# Patient Record
Sex: FEMALE | Race: WHITE | NOT HISPANIC OR LATINO | Employment: OTHER | URBAN - METROPOLITAN AREA
[De-identification: names, ages, dates, MRNs, and addresses within clinical notes are randomized per-mention and may not be internally consistent; named-entity substitution may affect disease eponyms.]

---

## 2017-01-26 ENCOUNTER — ALLSCRIPTS OFFICE VISIT (OUTPATIENT)
Dept: OTHER | Facility: OTHER | Age: 82
End: 2017-01-26

## 2017-02-06 ENCOUNTER — ALLSCRIPTS OFFICE VISIT (OUTPATIENT)
Dept: OTHER | Facility: OTHER | Age: 82
End: 2017-02-06

## 2017-02-06 ENCOUNTER — TRANSCRIBE ORDERS (OUTPATIENT)
Dept: ADMINISTRATIVE | Facility: HOSPITAL | Age: 82
End: 2017-02-06

## 2017-02-06 DIAGNOSIS — Z12.31 ENCOUNTER FOR SCREENING MAMMOGRAM FOR MALIGNANT NEOPLASM OF BREAST: ICD-10-CM

## 2017-02-06 DIAGNOSIS — C56.9 MALIGNANT NEOPLASM OF OVARY (HCC): ICD-10-CM

## 2017-02-06 DIAGNOSIS — C56.9 MALIGNANT NEOPLASM OF OVARY, UNSPECIFIED LATERALITY (HCC): Primary | ICD-10-CM

## 2017-02-08 ENCOUNTER — GENERIC CONVERSION - ENCOUNTER (OUTPATIENT)
Dept: OTHER | Facility: OTHER | Age: 82
End: 2017-02-08

## 2017-02-15 ENCOUNTER — HOSPITAL ENCOUNTER (OUTPATIENT)
Dept: CT IMAGING | Facility: CLINIC | Age: 82
Discharge: HOME | End: 2017-02-15
Payer: COMMERCIAL

## 2017-02-15 DIAGNOSIS — C56.9 MALIGNANT NEOPLASM OF OVARY, UNSPECIFIED LATERALITY (HCC): ICD-10-CM

## 2017-02-15 PROCEDURE — 71260 CT THORAX DX C+: CPT

## 2017-02-15 PROCEDURE — 74177 CT ABD & PELVIS W/CONTRAST: CPT

## 2017-02-15 RX ADMIN — IODIXANOL 70 ML: 320 INJECTION, SOLUTION INTRAVASCULAR at 11:52

## 2017-02-15 RX ADMIN — IOHEXOL 50 ML: 240 INJECTION, SOLUTION INTRATHECAL; INTRAVASCULAR; INTRAVENOUS; ORAL at 11:52

## 2017-03-28 ENCOUNTER — ALLSCRIPTS OFFICE VISIT (OUTPATIENT)
Dept: OTHER | Facility: OTHER | Age: 82
End: 2017-03-28

## 2017-05-06 DIAGNOSIS — C56.9 MALIGNANT NEOPLASM OF OVARY (HCC): ICD-10-CM

## 2017-05-06 DIAGNOSIS — R10.30 LOWER ABDOMINAL PAIN: ICD-10-CM

## 2017-05-09 ENCOUNTER — HOSPITAL ENCOUNTER (OUTPATIENT)
Dept: RADIOLOGY | Facility: HOSPITAL | Age: 82
Discharge: HOME | End: 2017-05-09
Attending: OBSTETRICS & GYNECOLOGY
Payer: COMMERCIAL

## 2017-05-09 ENCOUNTER — APPOINTMENT (OUTPATIENT)
Dept: LAB | Facility: HOSPITAL | Age: 82
End: 2017-05-09
Attending: OBSTETRICS & GYNECOLOGY
Payer: COMMERCIAL

## 2017-05-09 ENCOUNTER — ALLSCRIPTS OFFICE VISIT (OUTPATIENT)
Dept: OTHER | Facility: OTHER | Age: 82
End: 2017-05-09

## 2017-05-09 DIAGNOSIS — R10.30 LOWER ABDOMINAL PAIN: ICD-10-CM

## 2017-05-09 DIAGNOSIS — C56.9 MALIGNANT NEOPLASM OF OVARY (HCC): ICD-10-CM

## 2017-05-09 LAB — CANCER AG125 SERPL-ACNC: 10.5 U/ML (ref 0–30)

## 2017-05-09 PROCEDURE — 36415 COLL VENOUS BLD VENIPUNCTURE: CPT

## 2017-05-09 PROCEDURE — 86304 IMMUNOASSAY TUMOR CA 125: CPT

## 2017-05-09 PROCEDURE — 73502 X-RAY EXAM HIP UNI 2-3 VIEWS: CPT

## 2017-05-11 ENCOUNTER — GENERIC CONVERSION - ENCOUNTER (OUTPATIENT)
Dept: OTHER | Facility: OTHER | Age: 82
End: 2017-05-11

## 2017-05-11 ENCOUNTER — ALLSCRIPTS OFFICE VISIT (OUTPATIENT)
Dept: OTHER | Facility: OTHER | Age: 82
End: 2017-05-11

## 2017-06-01 ENCOUNTER — GENERIC CONVERSION - ENCOUNTER (OUTPATIENT)
Dept: OTHER | Facility: OTHER | Age: 82
End: 2017-06-01

## 2017-06-20 ENCOUNTER — ALLSCRIPTS OFFICE VISIT (OUTPATIENT)
Dept: OTHER | Facility: OTHER | Age: 82
End: 2017-06-20

## 2017-06-21 ENCOUNTER — GENERIC CONVERSION - ENCOUNTER (OUTPATIENT)
Dept: OTHER | Facility: OTHER | Age: 82
End: 2017-06-21

## 2017-07-17 ENCOUNTER — GENERIC CONVERSION - ENCOUNTER (OUTPATIENT)
Dept: OTHER | Facility: OTHER | Age: 82
End: 2017-07-17

## 2017-07-26 ENCOUNTER — ALLSCRIPTS OFFICE VISIT (OUTPATIENT)
Dept: OTHER | Facility: OTHER | Age: 82
End: 2017-07-26

## 2017-09-01 ENCOUNTER — ALLSCRIPTS OFFICE VISIT (OUTPATIENT)
Dept: OTHER | Facility: OTHER | Age: 82
End: 2017-09-01

## 2017-09-01 DIAGNOSIS — R25.2 CRAMP AND SPASM: ICD-10-CM

## 2017-09-26 ENCOUNTER — GENERIC CONVERSION - ENCOUNTER (OUTPATIENT)
Dept: OTHER | Facility: OTHER | Age: 82
End: 2017-09-26

## 2017-09-29 ENCOUNTER — GENERIC CONVERSION - ENCOUNTER (OUTPATIENT)
Dept: OTHER | Facility: OTHER | Age: 82
End: 2017-09-29

## 2017-11-11 DIAGNOSIS — C56.9 MALIGNANT NEOPLASM OF OVARY (HCC): ICD-10-CM

## 2017-11-16 ENCOUNTER — ALLSCRIPTS OFFICE VISIT (OUTPATIENT)
Dept: OTHER | Facility: OTHER | Age: 82
End: 2017-11-16

## 2017-11-16 ENCOUNTER — APPOINTMENT (OUTPATIENT)
Dept: LAB | Facility: HOSPITAL | Age: 82
End: 2017-11-16
Payer: COMMERCIAL

## 2017-11-16 ENCOUNTER — TRANSCRIBE ORDERS (OUTPATIENT)
Dept: ADMINISTRATIVE | Facility: HOSPITAL | Age: 82
End: 2017-11-16

## 2017-11-16 DIAGNOSIS — C56.9 MALIGNANT NEOPLASM OF OVARY, UNSPECIFIED LATERALITY (HCC): Primary | ICD-10-CM

## 2017-11-16 DIAGNOSIS — C56.9 MALIGNANT NEOPLASM OF OVARY (HCC): ICD-10-CM

## 2017-11-16 LAB — CANCER AG125 SERPL-ACNC: 7.1 U/ML (ref 0–30)

## 2017-11-16 PROCEDURE — 36415 COLL VENOUS BLD VENIPUNCTURE: CPT

## 2017-11-16 PROCEDURE — 86304 IMMUNOASSAY TUMOR CA 125: CPT

## 2017-11-17 NOTE — PROGRESS NOTES
Assessment    1  Ovarian cancer (183 0) (C56 9)  This is an [de-identified]year-old with advanced ovarian cancer  She is clinically without evidence of disease recurrence  Her performance status is zero  Plan  Ovarian cancer    · (1) BUN; Status:Active; Requested for:09May2018; Perform:[facility]; YWV:[id number]; Last Updated By:Pedro Haro; 11/16/2017 11:24:38 AM;Ordered;For:Ovarian cancer; Ordered By:Ashwini Sneed;   · (1) ; Status:Active; Requested for:09May2018; Perform:[facility]; HRE:[id number]; Last Updated By:Pedro Haro; 11/16/2017 11:28:21 AM;Ordered;For:Ovarian cancer; Ordered By:Ashwini Sneed;   · (1) ; Status:Active; Requested for:16Nov2017;    Perform:[facility]; BPW:[id number]; Ordered;For:Ovarian cancer; Ordered By:Gisselle Sneed;   · (1) CREATININE; Status:Active; Requested for:09May2018; Perform:[facility]; SN:[id number]; Last Updated By:Pedro Haro; 11/16/2017 11:25:00 AM;Ordered;cancer; Jennifer Broccoli By:Gisselle Sneed;   · * CT CHEST ABDOMEN PELVIS W CONTRAST; Status:Need Information - FinancialAuthorization; Requested for:16May2018; Perform:[facility]; GGX:[id number]; Last Updated Melinda Alvarado; 11/16/2017 11:26:11 AM;Ordered;cancer; Jennifer Broccoli By:Gisselle Sneed;   · Follow-up visit in 6 months Evaluation and Treatment  Follow-up  Status: Hold For -Scheduling  Requested for: [id number]   Ordered; Ovarian cancer; Ordered By: Hilary Ignacio Performed:  Due: 16DPD0626  1   now  2  The patient will return to the office in 6 months for continued ovarian cancer surveillance  Prior to that visit a  and surveillance CT scan will be performed  Chief Complaint  Chief Complaint Free Text Note Form: Ovarian Cancer Surveillance  History of Present Illness  Problem [facility]:   1  Advanced ovarian cancer with recurrencePersonal history of breast cancer  Previous Therapy:   1   Optimal cytoreductive surgery in November 2008 followed by carboplatin and Taxol for 6 cyclesBRCA negative, 2010Exploratory laparotomy, lymphadenectomy, splenectomy, omentectomy April 9, 2013Recurrent papillary serous carcinoma identifiedPlacement of intraperitoneal portsChemotherapy with intravenous and intraperitoneal chemotherapy for 9 cycles followed by Hycamtin maintenance for 12 cycles completed in April 2015  Free Text HPI: This is an 51-year-old who was last evaluated on May 11, 2017  She presents to the office for continued ovarian cancer surveillance  The patient is without acute complaints and denies significant changes in her medical history  The patient denies abdominal or pelvic pain  Her appetite is appropriate  She is voiding and moving her bowls without difficulty  She is ambulatory  Review of Systems  Complete-Female Gyn Onc:  Constitutional: No fever, no recent weight gain, no chills, not feeling tired and no recent weight loss  Eyes: No complaints of eye pain, no red eyes, no eyesight problems, no discharge, no dry eyes, no itching of eyes  ENT: no nose bleeds  Cardiovascular: No chest pain, no lower extremity edema  Respiratory: No shortness of breath  Gastrointestinal: No abdominal pain, no constipation, no nausea or vomiting, no bloody stools  Genitourinary: No complaints of dysuria, no incontinence, no pelvic pain, no dysmenorrhea, no vaginal discharge or bleeding  Musculoskeletal: arthralgias  Integumentary: No skin lesions  Neurological: No headache, no neuropathy  Psychiatric: Not suicidal, no sleep disturbance, no anxiety or depression, no change in personality, no emotional problems  Endocrine: No complaints of proptosis, no hot flashes, no muscle weakness, no deepening of the voice, no feelings of weakness  Hematologic/Lymphatic: No complaints of swollen glands, no swollen glands in the neck, does not bleed easily, does not bruise easily  Active Problems  1  Acquired hypothyroidism (244 9) (E03 9)   2  Age related osteoporosis (733 01) (M81 0)   3  Arthritis of right hip (716 95) (M16 11)   4  Benign essential hypertension (401 1) (I10)   5  Bradycardia (427 89) (R00 1)   6  Elevated blood sugar (790 29) (R73 9)   7  Encounter for screening mammogram for breast cancer (V76 12) (Z12 31)   8  Hypomagnesemia (275 2) (E83 42)   9  Malignant neoplasm of skin (173 90) (C44 90)   10  Muscle cramps (729 82) (R25 2)   11  Needs flu shot (V04 81) (Z23)   12  Ovarian cancer (183 0) (C56 9)   13  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)   14  Proteinuria (791 0) (R80 9)   15  Seborrheic keratosis (702 19) (L82 1)    Past Medical History  1  History of Dysplastic colon polyp (211 3) (K63 5)   2  H/O mammogram (V15 89) (Z92 89)   3  H/O nonmelanoma skin cancer (V10 83) (V37 856)   4  History of chemotherapy (V87 41) (Z92 21)   5  History of colonoscopy (V45 89) (Z98 890)   6  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)   7  History of neoplasm of uncertain behavior of skin (V13 3) (Z86 03)   8  History of ovarian cancer (V10 43) (Z85 43)   9  History of Hormone replacement therapy (HRT) (V07 4) (Z79 890)   10  History of Hypertrophic condition of skin (701 9) (L91 9)   11  History of Malignant neoplasm of areola of right breast in female (174 0) (C50 011)   12  History of Malignant Neoplasm Of Skin Of Face (173 30)   13  History of Ovarian cancer, bilateral (183 0) (C56 1,C56 2)   14  History of Radiation Therapy (V15 3)    Surgical History  1  History of Excision Of Lesion Face   2  History of Excision Of Lesion Nose   3  History of Excision Of Lesion Trunk   4  History of Knee Surgery   5  History of Radical Total Abdominal Hysterectomy   6  History of Resection Of Ovarian Malig  + BSO, Omentectomy, Debulking   7  History of Shoulder Surgery   8  History of Splenectomy   9  History of Tonsillectomy With Adenoidectomy    Family History  Mother    1  Denied: Family history of mental disorder   2   Denied: Family history of Illicit drug use  Family History    3  Family history of No Significant Family History    Social History     · Former smoker (L56 43) (L84 684)   ·    · Occasional alcohol consumption (V49 89) (Z78 9)    Current Meds   1  Aspir-Low 81 MG Oral Tablet Delayed Release Recorded   2  Bisoprolol-Hydrochlorothiazide 2 5-6 25 MG Oral Tablet; TAKE 1 TABLET DAILY AS DIRECTED; Therapy: 18Jge2815 to (Evaluate:30Nov2017); Last Rx:49Qvq3770 Ordered   3  Calcium 600 TABS; Therapy: (Recorded:21Vig5000) to Recorded   4  Calcium 600+D 600-400 MG-UNIT Oral Tablet Recorded   5  D-3-5 5000 UNIT Oral Capsule; Take as directed Recorded   6  Felodipine ER 10 MG Oral Tablet Extended Release 24 Hour; TAKE 1 TABLET ONCE DAILY  Requested for: 20Jun2017; Last Rx:20Jun2017 Ordered   7  Fluticasone Propionate 50 MCG/ACT Nasal Suspension; USE 1 TO 2 SPRAYS IN EACH NOSTRIL ONCE DAILY; Therapy: 19Mgv5427 to (Last Susan Shipley)  Requested for: 20Jun2017 Ordered   8  HydroCHLOROthiazide 12 5 MG Oral Tablet; TAKE 1 TABLET DAILY AS NEEDED  Requested for: 20Jun2017; Last Rx:20Jun2017 Ordered   9  Irbesartan 300 MG Oral Tablet; TAKE 1 TABLET DAILY  Requested for: 20Jun2017; Last Rx:20Jun2017 Ordered   10  Levothyroxine Sodium 125 MCG Oral Tablet; TAKE 1 TABLET DAILY AS DIRECTED   Requested for: 16ITV4138; Last Rx:09Pts7068 Ordered   11  Magnesium 500 MG Oral Capsule; One po BID; Therapy: 78Rnz4242 to (Last Rx:20Kgn2750) Ordered   12  Omega-3 1000 MG Oral Capsule Recorded   13  Systane Ultra SOLN Recorded  Medication List Reviewed: The medication list was reviewed and updated today  Allergies  1   No Known Drug Allergies    Vitals  Vital Signs    Recorded: 58ISJ9104 11:00AM   Temperature 97 5 F   Heart Rate 56   Respiration 16   Systolic 725   Diastolic 74   Height 5 ft    Weight 165 lb    BMI Calculated 32 22   BSA Calculated 1 72       Physical Exam   Constitutional  General appearance: No acute distress, well appearing and well nourished  Neck  Neck: Normal, supple, trachea midline, no masses  Thyroid: Normal, no thyromegaly  Pulmonary  Respiratory effort: No increased work of breathing or signs of respiratory distress  Genitourinary  External genitalia: Normal and no lesions appreciated  Vagina: Normal, no lesions or dryness appreciated  -- No masses, lesions or bleeding  Urethra: Normal    Urethral meatus: Normal    Bladder: Normal, soft, non-tender and no prolapse or masses appreciated  Cervix: Surgically absent  Uterus: Surgically absent  Adnexa/parametria: Surgically absent  -- No masses or fullness  Abdomen  Abdomen: Normal, soft, non-tender, and no organomegaly noted  Examination for hernias: No hernias appreciated  Skin  Skin and subcutaneous tissue: Normal skin turgor and no rashes  Psychiatric  Orientation to person, place, and time: Normal    Mood and affect: Normal    GOG performance status is: 0      Future Appointments    Date/Time Provider Specialty Site   02/02/2018 02:00 PM Tyrone Brownlee, [address] [place] [address]   05/24/2018 10:30 AM Shanice Barakat [facility] Gynecological Oncology [facility] [place]   07/30/2018 02:35 PM PHU Thompson   Dermatology [facility]       Signatures   Electronically signed by : Wagner Interiano, [facility]; Nov 16 2017 11:13AM EST                       (Author)    Electronically signed by : Luigi Brar MD; Nov 16 2017  4:00PM EST                       (Author)

## 2017-11-24 ENCOUNTER — APPOINTMENT (EMERGENCY)
Dept: RADIOLOGY | Facility: HOSPITAL | Age: 82
End: 2017-11-24
Payer: COMMERCIAL

## 2017-11-24 ENCOUNTER — APPOINTMENT (EMERGENCY)
Dept: ULTRASOUND IMAGING | Facility: HOSPITAL | Age: 82
End: 2017-11-24
Payer: COMMERCIAL

## 2017-11-24 ENCOUNTER — HOSPITAL ENCOUNTER (EMERGENCY)
Facility: HOSPITAL | Age: 82
Discharge: HOME | End: 2017-11-24
Attending: EMERGENCY MEDICINE
Payer: COMMERCIAL

## 2017-11-24 ENCOUNTER — GENERIC CONVERSION - ENCOUNTER (OUTPATIENT)
Dept: OTHER | Facility: OTHER | Age: 82
End: 2017-11-24

## 2017-11-24 VITALS
DIASTOLIC BLOOD PRESSURE: 82 MMHG | HEART RATE: 55 BPM | TEMPERATURE: 98 F | SYSTOLIC BLOOD PRESSURE: 154 MMHG | WEIGHT: 164 LBS | HEIGHT: 61 IN | BODY MASS INDEX: 30.96 KG/M2 | RESPIRATION RATE: 16 BRPM | OXYGEN SATURATION: 94 %

## 2017-11-24 DIAGNOSIS — R60.0 BILATERAL LOWER EXTREMITY EDEMA: Primary | ICD-10-CM

## 2017-11-24 LAB
ALBUMIN SERPL BCP-MCNC: 3.5 G/DL (ref 3.5–5)
ALP SERPL-CCNC: 55 U/L (ref 46–116)
ALT SERPL W P-5'-P-CCNC: 22 U/L (ref 12–78)
ANION GAP SERPL CALCULATED.3IONS-SCNC: 7 MMOL/L (ref 4–13)
AST SERPL W P-5'-P-CCNC: 18 U/L (ref 5–45)
BASOPHILS # BLD AUTO: 0.05 THOUSANDS/ΜL (ref 0–0.1)
BASOPHILS NFR BLD AUTO: 1 % (ref 0–1)
BILIRUB SERPL-MCNC: 0.4 MG/DL (ref 0.2–1)
BUN SERPL-MCNC: 18 MG/DL (ref 5–25)
CALCIUM SERPL-MCNC: 9.9 MG/DL (ref 8.3–10.1)
CHLORIDE SERPL-SCNC: 101 MMOL/L (ref 100–108)
CO2 SERPL-SCNC: 33 MMOL/L (ref 21–32)
CREAT SERPL-MCNC: 0.66 MG/DL (ref 0.6–1.3)
EOSINOPHIL # BLD AUTO: 0.4 THOUSAND/ΜL (ref 0–0.61)
EOSINOPHIL NFR BLD AUTO: 4 % (ref 0–6)
ERYTHROCYTE [DISTWIDTH] IN BLOOD BY AUTOMATED COUNT: 13.3 % (ref 11.6–15.1)
GFR SERPL CREATININE-BSD FRML MDRD: 84 ML/MIN/1.73SQ M
GLUCOSE SERPL-MCNC: 91 MG/DL (ref 65–140)
HCT VFR BLD AUTO: 45.3 % (ref 34.8–46.1)
HGB BLD-MCNC: 15.4 G/DL (ref 11.5–15.4)
LYMPHOCYTES # BLD AUTO: 3.09 THOUSANDS/ΜL (ref 0.6–4.47)
LYMPHOCYTES NFR BLD AUTO: 33 % (ref 14–44)
MCH RBC QN AUTO: 32 PG (ref 26.8–34.3)
MCHC RBC AUTO-ENTMCNC: 34 G/DL (ref 31.4–37.4)
MCV RBC AUTO: 94 FL (ref 82–98)
MONOCYTES # BLD AUTO: 1.14 THOUSAND/ΜL (ref 0.17–1.22)
MONOCYTES NFR BLD AUTO: 12 % (ref 4–12)
NEUTROPHILS # BLD AUTO: 4.6 THOUSANDS/ΜL (ref 1.85–7.62)
NEUTS SEG NFR BLD AUTO: 50 % (ref 43–75)
NRBC BLD AUTO-RTO: 0 /100 WBCS
PLATELET # BLD AUTO: 355 THOUSANDS/UL (ref 149–390)
PMV BLD AUTO: 10.1 FL (ref 8.9–12.7)
POTASSIUM SERPL-SCNC: 3.5 MMOL/L (ref 3.5–5.3)
PROT SERPL-MCNC: 7.6 G/DL (ref 6.4–8.2)
RBC # BLD AUTO: 4.82 MILLION/UL (ref 3.81–5.12)
SODIUM SERPL-SCNC: 141 MMOL/L (ref 136–145)
WBC # BLD AUTO: 9.3 THOUSAND/UL (ref 4.31–10.16)

## 2017-11-24 PROCEDURE — 80053 COMPREHEN METABOLIC PANEL: CPT | Performed by: NURSE PRACTITIONER

## 2017-11-24 PROCEDURE — 71020 HB CHEST X-RAY 2VW FRONTAL&LATL: CPT

## 2017-11-24 PROCEDURE — 85025 COMPLETE CBC W/AUTO DIFF WBC: CPT | Performed by: NURSE PRACTITIONER

## 2017-11-24 PROCEDURE — 93005 ELECTROCARDIOGRAM TRACING: CPT | Performed by: NURSE PRACTITIONER

## 2017-11-24 PROCEDURE — 99284 EMERGENCY DEPT VISIT MOD MDM: CPT

## 2017-11-24 PROCEDURE — 36415 COLL VENOUS BLD VENIPUNCTURE: CPT | Performed by: NURSE PRACTITIONER

## 2017-11-24 PROCEDURE — 93971 EXTREMITY STUDY: CPT

## 2017-11-24 RX ORDER — HYDROCHLOROTHIAZIDE 12.5 MG/1
12.5 TABLET ORAL DAILY PRN
COMMUNITY
End: 2018-02-16 | Stop reason: SDUPTHER

## 2017-11-24 RX ORDER — FELODIPINE 10 MG/1
10 TABLET, EXTENDED RELEASE ORAL DAILY
COMMUNITY
End: 2018-02-16 | Stop reason: SDUPTHER

## 2017-11-24 RX ORDER — FLUTICASONE PROPIONATE 50 MCG
1 SPRAY, SUSPENSION (ML) NASAL DAILY
COMMUNITY
End: 2018-02-09 | Stop reason: SDUPTHER

## 2017-11-24 RX ORDER — ASPIRIN 81 MG/1
81 TABLET ORAL DAILY
COMMUNITY

## 2017-11-24 RX ORDER — BISOPROLOL FUMARATE AND HYDROCHLOROTHIAZIDE 2.5; 6.25 MG/1; MG/1
1 TABLET ORAL DAILY
COMMUNITY
End: 2018-02-16 | Stop reason: SDUPTHER

## 2017-11-24 RX ORDER — LEVOTHYROXINE SODIUM 0.12 MG/1
125 TABLET ORAL DAILY
COMMUNITY
End: 2018-02-16 | Stop reason: SDUPTHER

## 2017-11-24 RX ORDER — IRBESARTAN 150 MG/1
300 TABLET ORAL
COMMUNITY
End: 2018-02-16 | Stop reason: SDUPTHER

## 2017-11-24 NOTE — DISCHARGE INSTRUCTIONS
Leg Edema   WHAT YOU NEED TO KNOW:   Leg edema is swelling caused by fluid buildup  Your legs may swell if you sit or stand for long periods of time, are pregnant, or are injured  Swelling may also occur if you have heart failure or circulation problems  This means that your heart does not pump blood through your body as it should  DISCHARGE INSTRUCTIONS:   Self-care:   · Elevate your legs:  Raise your legs above the level of your heart as often as you can  This will help decrease swelling and pain  Prop your legs on pillows or blankets to keep them elevated comfortably  · Wear pressure stockings: These tight stockings put pressure on your legs to promote blood flow and prevent blood clots  Wear the stockings during the day  Do not wear them while you sleep  · Apply heat:  Heat helps decrease pain and swelling  Apply heat on the area for 20 to 30 minutes every 2 hours for as many days as directed  · Stay active:  Do not stand or sit for long periods of time  Ask your healthcare provider about the best exercise plan for you  · Eat healthy foods:  Healthy foods include fruits, vegetables, whole-grain breads, low-fat dairy products, beans, lean meats, and fish  Ask if you need to be on a special diet  Limit salt  Salt will make your body hold even more fluid  Follow up with your healthcare provider as directed:  Write down your questions so you remember to ask them during your visits  Contact your healthcare provider if:   · You have a fever or feel more tired than usual     · The veins in your legs look larger than usual  They may look full or bulging  · Your legs itch or feel heavy  · You have red or white areas or sores on your legs  The skin may also appear dimpled or have indentations  · You are gaining weight  · You have trouble moving your ankles  · The swelling does not go away, or other parts of your body swell      · You have questions or concerns about your condition or care   Return to the emergency department if:   · You cannot walk  · You feel faint or confused  · Your skin turns blue or gray  · Your leg feels warm, tender, and painful  It may be swollen and red  · You have chest pain or trouble breathing that is worse when you lie down  · You suddenly feel lightheaded and have trouble breathing  · You have new and sudden chest pain  You may have more pain when you take deep breaths or cough  You may also cough up blood  © 2017 [address]  Information is for End User's use only and may not be sold, redistributed or otherwise used for commercial purposes  All illustrations and images included in CareNotes® are the copyrighted property of A D A M , Inc  or Boris Heredia  The above information is an  only  It is not intended as medical advice for individual conditions or treatments  Talk to your doctor, nurse or pharmacist before following any medical regimen to see if it is safe and effective for you

## 2017-11-24 NOTE — ED PROVIDER NOTES
History  Chief Complaint   Patient presents with    Leg Swelling     Pt with bilateral leg swelling and a heaviness      43-year-old female presenting here today with chief complaint of increased edema of her lower extremities  She states she has always had edema and she wears knee-high compression stockings but she has noticed lately that the area above the knee high compression stockings has had increasing edema  She reports that she does spend a long time on her feet  She is describing a sensation or discomfort in the left leg that she cannot explain  She denies any shortness of breath  She does have a history of cancer  She has had previous episodes of phlebitis  States that she used to take warfarin and she believe that she was taking it for port at the time when she had chemo  Prior to Admission Medications   Prescriptions Last Dose Informant Patient Reported? Taking?   aspirin (ECOTRIN LOW STRENGTH) 81 mg EC tablet   Yes Yes   Sig: Take 81 mg by mouth daily   bisoprolol-hydrochlorothiazide (ZIAC) 2 5-6 25 MG per tablet   Yes Yes   Sig: Take 1 tablet by mouth daily   felodipine (PLENDIL) 10 MG 24 hr tablet   Yes Yes   Sig: Take 10 mg by mouth daily   fluticasone (FLONASE) 50 mcg/act nasal spray   Yes Yes   Si spray into each nostril daily   hydrochlorothiazide (HYDRODIURIL) 12 5 mg tablet   Yes Yes   Sig: Take 12 5 mg by mouth daily as needed   irbesartan (AVAPRO) 150 mg tablet   Yes Yes   Sig: Take 300 mg by mouth daily at bedtime   levothyroxine 125 mcg tablet   Yes Yes   Sig: Take 125 mcg by mouth daily      Facility-Administered Medications: None       Past Medical History:   Diagnosis Date    Cancer ([facility] [address] )     Hypertension        History reviewed  No pertinent surgical history  History reviewed  No pertinent family history  I have reviewed and agree with the history as documented      Social History   Substance Use Topics    Smoking status: Never Smoker    Smokeless tobacco: Never Used    Alcohol use No        Review of Systems   Constitutional: Negative for diaphoresis, fatigue and fever  HENT: Negative for congestion, ear pain, nosebleeds and sore throat  Eyes: Negative for photophobia, pain, discharge and visual disturbance  Respiratory: Negative for cough, choking, chest tightness, shortness of breath and wheezing  Cardiovascular: Negative for chest pain and palpitations  Gastrointestinal: Negative for abdominal distention, abdominal pain, diarrhea and vomiting  Genitourinary: Negative for dysuria, flank pain and frequency  Musculoskeletal: Negative for back pain, gait problem and joint swelling  Skin: Negative for color change and rash  Neurological: Negative for dizziness, syncope and headaches  Psychiatric/Behavioral: Negative for behavioral problems and confusion  The patient is not nervous/anxious  All other systems reviewed and are negative  Physical Exam  ED Triage Vitals [11/24/17 1608]   Temperature Pulse Respirations Blood Pressure SpO2   98 °F (36 7 °C) (!) 54 16 (!) 198/88 93 %      Temp Source Heart Rate Source Patient Position - Orthostatic VS BP Location FiO2 (%)   Oral Monitor Sitting Left arm --      Pain Score       No Pain           Orthostatic Vital Signs  Vitals:    11/24/17 1608 11/24/17 1821   BP: (!) 198/88 154/82   Pulse: (!) 54 55   Patient Position - Orthostatic VS: Sitting Lying       Physical Exam   Constitutional: She is oriented to person, place, and time  She appears well-developed and well-nourished  She is cooperative  Non-toxic appearance  She does not have a sickly appearance  She does not appear ill  No distress  HENT:   Head: Normocephalic and atraumatic  Right Ear: Tympanic membrane and external ear normal    Left Ear: Tympanic membrane and external ear normal    Nose: No rhinorrhea, sinus tenderness or nasal deformity  No epistaxis   Right sinus exhibits no maxillary sinus tenderness and no frontal sinus tenderness  Left sinus exhibits no maxillary sinus tenderness and no frontal sinus tenderness  Mouth/Throat: Oropharynx is clear and moist and mucous membranes are normal  Normal dentition  Eyes: EOM are normal  Pupils are equal, round, and reactive to light  Neck: Normal range of motion  Neck supple  Cardiovascular: Normal rate, regular rhythm and normal heart sounds  No murmur heard  Pulmonary/Chest: Effort normal and breath sounds normal  No accessory muscle usage  No respiratory distress  She has no wheezes  She has no rales  She exhibits no tenderness  Abdominal: Soft  She exhibits no distension  There is no guarding  Musculoskeletal: Normal range of motion  She exhibits edema  She exhibits no tenderness  Lymphadenopathy:     She has no cervical adenopathy  Neurological: She is alert and oriented to person, place, and time  She exhibits normal muscle tone  Skin: Skin is warm and dry  No rash noted  No erythema  Psychiatric: She has a normal mood and affect  Nursing note and vitals reviewed  ED Medications  Medications - No data to display    Diagnostic Studies  Results Reviewed     Procedure Component Value Units Date/Time    Comprehensive metabolic panel [[id number]]  (Abnormal) Collected:  11/24/17 1644    Lab Status:  Final result Specimen:  Blood from Arm, Left Updated:  11/24/17 1704     Sodium 141 mmol/L      Potassium 3 5 mmol/L      Chloride 101 mmol/L      CO2 33 (H) mmol/L      Anion Gap 7 mmol/L      BUN 18 mg/dL      Creatinine 0 66 mg/dL      Glucose 91 mg/dL      Calcium 9 9 mg/dL      AST 18 U/L      ALT 22 U/L      Alkaline Phosphatase 55 U/L      Total Protein 7 6 g/dL      Albumin 3 5 g/dL      Total Bilirubin 0 40 mg/dL      eGFR 84 ml/min/1 73sq m     Narrative:         National Kidney Disease Education Program recommendations are as follows:  GFR calculation is accurate only with a steady state creatinine  Chronic Kidney disease less than 60 ml/min/1 73 sq  meters  Kidney failure less than 15 ml/min/1 73 sq  meters  CBC and differential [[id number]]  (Normal) Collected:  11/24/17 1644    Lab Status:  Final result Specimen:  Blood from Arm, Left Updated:  11/24/17 1649     WBC 9 30 Thousand/uL      RBC 4 82 Million/uL      Hemoglobin 15 4 g/dL      Hematocrit 45 3 %      MCV 94 fL      MCH 32 0 pg      MCHC 34 0 g/dL      RDW 13 3 %      MPV 10 1 fL      Platelets 637 Thousands/uL      nRBC 0 /100 WBCs      Neutrophils Relative 50 %      Lymphocytes Relative 33 %      Monocytes Relative 12 %      Eosinophils Relative 4 %      Basophils Relative 1 %      Neutrophils Absolute 4 60 Thousands/µL      Lymphocytes Absolute 3 09 Thousands/µL      Monocytes Absolute 1 14 Thousand/µL      Eosinophils Absolute 0 40 Thousand/µL      Basophils Absolute 0 05 Thousands/µL                  XR chest 2 views   Final Result by Rolf Russell MD (11/24 2038)      No active pulmonary disease  Mild cardiomegaly and tortuous thoracic aorta with atherosclerosis  No acute abnormality identified           Workstation performed: [id number]         VAS lower limb venous duplex study, unilateral/limited    (Results Pending)              Procedures  ECG 12 Lead Documentation  Date/Time: 11/24/2017 10:34 PM  Performed by: Asha Baker  Authorized by: Alda Runner L     ECG reviewed by me, the ED Provider: yes    Patient location:  ED  Previous ECG:     Previous ECG:  Unavailable  Interpretation:     Interpretation: normal    Rate:     ECG rate:  47    ECG rate assessment: normal    Rhythm:     Rhythm: sinus rhythm    Ectopy:     Ectopy: none    QRS:     QRS axis:  Normal  Conduction:     Conduction: normal    ST segments:     ST segments:  Normal  T waves:     T waves: normal             Phone Contacts  ED Phone Contact    ED Course  ED Course                                MDM  Number of Diagnoses or Management Options  Bilateral lower extremity edema: new and requires workup Amount and/or Complexity of Data Reviewed  Clinical lab tests: reviewed and ordered  Tests in the radiology section of CPT®: reviewed and ordered  Tests in the medicine section of CPT®: reviewed and ordered  Independent visualization of images, tracings, or specimens: yes    Patient Progress  Patient progress: stable    CritCare Time    Disposition  Final diagnoses:   Bilateral lower extremity edema     Time reflects when diagnosis was documented in both MDM as applicable and the Disposition within this note     Time User Action Codes Description Comment    11/24/2017  6:09 PM Ej Ean Add [R60 0] Bilateral lower extremity edema       ED Disposition     ED Disposition Condition Comment    Discharge  Maciel Chan discharge to home/self care  Condition at discharge: Good        Follow-up Information     Follow up With Specialties Details Why Contact Info    Sheyla Gómez, [facility] Schedule an appointment as soon as possible for a visit For Continued Evaluation [address]  [address]  [phone]          Discharge Medication List as of 11/24/2017  6:09 PM      CONTINUE these medications which have NOT CHANGED    Details   aspirin (ECOTRIN LOW STRENGTH) 81 mg EC tablet Take 81 mg by mouth daily, Historical Med      bisoprolol-hydrochlorothiazide (ZIAC) 2 5-6 25 MG per tablet Take 1 tablet by mouth daily, Historical Med      felodipine (PLENDIL) 10 MG 24 hr tablet Take 10 mg by mouth daily, Historical Med      fluticasone (FLONASE) 50 mcg/act nasal spray 1 spray into each nostril daily, Historical Med      hydrochlorothiazide (HYDRODIURIL) 12 5 mg tablet Take 12 5 mg by mouth daily as needed, Historical Med      irbesartan (AVAPRO) 150 mg tablet Take 300 mg by mouth daily at bedtime, Historical Med      levothyroxine 125 mcg tablet Take 125 mcg by mouth daily, Historical Med           No discharge procedures on file      ED Provider  Electronically Signed by           RD Conroy  11/24/17 3551

## 2017-11-25 ENCOUNTER — GENERIC CONVERSION - ENCOUNTER (OUTPATIENT)
Dept: FAMILY MEDICINE CLINIC | Facility: CLINIC | Age: 82
End: 2017-11-25

## 2017-11-26 LAB
ATRIAL RATE: 47 BPM
P AXIS: 62 DEGREES
PR INTERVAL: 170 MS
QRS AXIS: 2 DEGREES
QRSD INTERVAL: 90 MS
QT INTERVAL: 470 MS
QTC INTERVAL: 415 MS
T WAVE AXIS: 39 DEGREES
VENTRICULAR RATE: 47 BPM

## 2017-12-06 ENCOUNTER — GENERIC CONVERSION - ENCOUNTER (OUTPATIENT)
Dept: GYNECOLOGIC ONCOLOGY | Facility: CLINIC | Age: 82
End: 2017-12-06

## 2017-12-15 ENCOUNTER — ALLSCRIPTS OFFICE VISIT (OUTPATIENT)
Dept: OTHER | Facility: OTHER | Age: 82
End: 2017-12-15

## 2017-12-16 NOTE — PROGRESS NOTES
Assessment  1  Benign essential hypertension (401 1) (I10)   2  Breast cancer (174 9) (C50 919)   3  Acquired hypothyroidism (244 9) (E03 9)    Plan  Acquired hypothyroidism    · (1) TSH WITH FT4 REFLEX; Status:Active; Requested for:15Mar2018; Benign essential hypertension    · (1) COMPREHENSIVE METABOLIC PANEL; Status:Active; Requested for:15Mar2018;    · (1) URINALYSIS w URINE C/S REFLEX (will reflex a microscopy if leukocytes, occultblood, or nitrites are not within normal limits); Status:Active; Requested for:15Mar2018;    · Follow-up visit in 3 months Evaluation and Treatment  Follow-up  Status: Complete Done: 54FSH0592    Discussion/Summary    Blood pressures from home are perfect  Will give Tdap today  Speak with Dr Srini Arcos regarding the continued follow-up for the breast cancer  I do agree at this point she may want you to just follow up here but speak with her about it 1st  Continue all your other meds  Keep your f/u with Dr Jabari Mar  Will get the xray and the us from [facility] done at the last visit  Try to avoid adding excess salt on your food  The patient was counseled regarding instructions for management,-- risk factor reductions,-- prognosis,-- patient and family education,-- impressions,-- risks and benefits of treatment options,-- importance of compliance with treatment  Possible side effects of new medications were reviewed with the patient/guardian today  The treatment plan was reviewed with the patient/guardian  The patient/guardian understands and agrees with the treatment plan      Chief Complaint  patient is here for er f/u      History of Present Illness  Pt is here for routine f/u  Needs a tdap as her granddaughter is having a baby  Has a list of bp's from home on her lower dose of bisoprolol  Numbers range from 105/66 to a max of 159/87  Most bp's in the 120-130's  Takes all meds as directed  No side effects noted   Also was in the er recently for leg pain and had xrays and us and now not having any problems  Also had cxr and ekg  Review of Systems   Constitutional: no fever-- and-- no chills  Eyes: No complaints of eye pain, no red eyes, no eyesight problems, no discharge, no dry eyes, no itching of eyes  ENT: no complaints of earache, no loss of hearing, no nose bleeds, no nasal discharge, no sore throat, no hoarseness  Cardiovascular: no chest pain-- and-- no palpitations  Respiratory: no cough,-- no wheezing-- and-- no shortness of breath during exertion  Gastrointestinal: no abdominal pain,-- no nausea,-- no constipation-- and-- no diarrhea  Genitourinary: no dysuria-- and-- no incontinence  Musculoskeletal: no arthralgias-- and-- no myalgias  Neurological: no headache,-- no numbness,-- no dizziness-- and-- no fainting  Hematologic/Lymphatic: Now will be following with Dr Raleigh Kinsey s/p breast and ovarian cancer  ROS reviewed  Active Problems  1  Acquired hypothyroidism (244 9) (E03 9)   2  Age related osteoporosis (733 01) (M81 0)   3  Arthritis of right hip (716 95) (M16 11)   4  Benign essential hypertension (401 1) (I10)   5  Blepharitis (373 00) (H01 009)   6  Bradycardia (427 89) (R00 1)   7  Breast cancer (174 9) (C50 919)   8  Elevated blood sugar (790 29) (R73 9)   9  Encounter for screening mammogram for breast cancer (V76 12) (Z12 31)   10  Flu vaccine need (V04 81) (Z23)   11  Food sticks on swallowing (787 20) (R13 10)   12  Groin pain, right (789 09) (R10 31)   13  Hypomagnesemia (275 2) (E83 42)   14  Lightheadedness (780 4) (R42)   15  Malignant neoplasm of skin (173 90) (C44 90)   16  Medicare annual wellness visit, initial (V70 0) (Z00 00)   17  Medication therapy changed (V58 69) (Z79 899)   18  Muscle cramps (729 82) (R25 2)   19  Needs flu shot (V04 81) (Z23)   20  Ovarian cancer (183 0) (C56 9)   21  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)   22  Pre-operative clearance (V72 84) (Z01 818)   23  Proteinuria (791 0) (R80 9)   24   Ptosis, 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)   25  Rhinitis, chronic (472 0) (J31 0)   26  Screening for malignant neoplasm of colon (V76 51) (Z12 11)   27  Screening for skin condition (V82 0) (Z13 89)   28  Seborrheic keratoses (702 19) (L82 1)   29  Seborrheic keratosis (702 19) (L82 1)   30  Tarry stools (578 1) (K92 1)   31  URI (upper respiratory infection) (465 9) (J06 9)    Past Medical History  1  History of Dysplastic colon polyp (211 3) (K63 5)   2  H/O mammogram (V15 89) (Z92 89)   3  H/O nonmelanoma skin cancer (V10 83) (V97 144)   4  History of chemotherapy (V87 41) (Z92 21)   5  History of colonoscopy (V45 89) (Z98 890)   6  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)   7  History of neoplasm of uncertain behavior of skin (V13 3) (Z86 03)   8  History of ovarian cancer (V10 43) (Z85 43)   9  History of Hormone replacement therapy (HRT) (V07 4) (Z79 890)   10  History of Hypertrophic condition of skin (701 9) (L91 9)   11  History of Malignant neoplasm of areola of right breast in female (174 0) (C50 011)   12  History of Malignant Neoplasm Of Skin Of Face (173 30)   13  History of Ovarian cancer, bilateral (183 0) (C56 1,C56 2)   14  History of Radiation Therapy (V15 3)    The active problems and past medical history were reviewed and updated today  Surgical History  1  History of Excision Of Lesion Face   2  History of Excision Of Lesion Nose   3  History of Excision Of Lesion Trunk   4  History of Knee Surgery   5  History of Radical Total Abdominal Hysterectomy   6  History of Resection Of Ovarian Malig  + BSO, Omentectomy, Debulking   7  History of Shoulder Surgery   8  History of Splenectomy   9  History of Tonsillectomy With Adenoidectomy    The surgical history was reviewed and updated today  Family History  Mother    1  Denied: Family history of mental disorder   2  Denied: Family history of Illicit drug use  Family History    3   Family history of No Significant Family History    The family history was reviewed and updated today  Social History   · Former smoker (X76 84) (U15 306)   ·    · Occasional alcohol consumption (V49 89) (Z78 9)  The social history was reviewed and updated today  Current Meds   1  Aspir-Low 81 MG Oral Tablet Delayed Release Recorded   2  Bisoprolol-Hydrochlorothiazide 2 5-6 25 MG Oral Tablet; TAKE 1 TABLET DAILY AS DIRECTED; Therapy: 01Sep2017 to (Evaluate:64Dll8316)  Requested for: 94QSQ1261; Last Rx:27Nov2017 Ordered   3  Calcium 600 TABS; Therapy: (Recorded:76Fow2253) to Recorded   4  Calcium 600+D 600-400 MG-UNIT Oral Tablet Recorded   5  D-3-5 5000 UNIT Oral Capsule; Take as directed Recorded   6  Felodipine ER 10 MG Oral Tablet Extended Release 24 Hour; TAKE 1 TABLET ONCE DAILY  Requested for: 20Jun2017; Last Rx:02Ydy1024 Ordered   7  Fluticasone Propionate 50 MCG/ACT Nasal Suspension; USE 1 TO 2 SPRAYS IN EACH NOSTRIL ONCE DAILY; Therapy: 73Kqz5151 to (Last Shanice Alaniz)  Requested for: 20Jun2017 Ordered   8  HydroCHLOROthiazide 12 5 MG Oral Tablet; TAKE 1 TABLET DAILY AS NEEDED  Requested for: 20Jun2017; Last Rx:51Juh4832 Ordered   9  Irbesartan 300 MG Oral Tablet; TAKE 1 TABLET DAILY  Requested for: 20Jun2017; Last Rx:14Vuk1418 Ordered   10  Levothyroxine Sodium 125 MCG Oral Tablet; TAKE 1 TABLET DAILY AS DIRECTED   Requested for: 04FVP6829; Last Rx:96Bzf9292 Ordered   11  Magnesium 500 MG Oral Capsule; One po BID; Therapy: 55Nys9166 to (Last Rx:84Uxu7112) Ordered   12  Omega-3 1000 MG Oral Capsule Recorded   13  Systane Ultra SOLN Recorded    The medication list was reviewed and updated today  Allergies  1   No Known Drug Allergies    Vitals  Vital Signs    Recorded: 92Hoa3780 10:02AM   Temperature 97 5 F, Tympanic   Heart Rate 56   Respiration 14   Systolic 637, LUE   Diastolic 74, LUE   Height 5 ft    Weight 168 lb 4 oz   BMI Calculated 32 86   BSA Calculated 1 73   O2 Saturation 93     Physical Exam   Constitutional  General appearance: No acute distress, well appearing and well nourished  Eyes  Conjunctiva and lids: No swelling, erythema or discharge  Pupils and irises: Equal, round and reactive to light  Ears, Nose, Mouth, and Throat  External inspection of ears and nose: Normal    Otoscopic examination: Tympanic membranes translucent with normal light reflex  Canals patent without erythema  Nasal mucosa, septum, and turbinates: Normal without edema or erythema  Oropharynx: Normal with no erythema, edema, exudate or lesions  Pulmonary  Respiratory effort: No increased work of breathing or signs of respiratory distress  Auscultation of lungs: Clear to auscultation  Cardiovascular  Auscultation of heart: Normal rate and rhythm, normal S1 and S2, without murmurs  Abdomen  Abdomen: Non-tender, no masses  Liver and spleen: No hepatomegaly or splenomegaly  Musculoskeletal  Gait and station: Normal    Inspection/palpation of joints, bones, and muscles: Normal  -- Doris Sinclair her delgado stockings at this exam   Neurologic  Cranial nerves: Cranial nerves 2-12 intact  Sensation: No sensory loss  Psychiatric  Orientation to person, place, and time: Normal    Mood and affect: Normal          Attending Note  Collaborating Note: I supervised the Advanced Practitioner-- and-- I agree with the Advanced Practitioner note  Future Appointments    Date/Time Provider Specialty Site   02/02/2018 02:00 PM Elver Erickson [address]   03/16/2018 11:30 AM Elver Erickson [facility]   05/24/2018 10:30 AM Maryjo Cronin [facility] Gynecological Oncology [facility] GYN ONCOLOGY [place]   07/30/2018 02:35 PM PHU Diaz  Dermatology [facility]     Signatures   Electronically signed by :  Haywood Lanes, CRNP; Dec 15 2017 10:24AM EST                       (Author)    Electronically signed by : Evans Fall DO; Dec 15 2017 5:34PM EST                       (Co-author)

## 2018-01-09 NOTE — RESULT NOTES
Verified Results  * XR HIP/PELV 2-3 VWS RIGHT W PELVIS IF PERFORMED 76YVV0957 10:56AM Lars Cueto Order Number: [id number]     Test Name Result Flag Reference   * XR HIP/PELV 2-3 VWS RIGHT (Report)     RIGHT HIP     INDICATION: Right hip pain  COMPARISON: None     VIEWS: AP pelvis and 2 coned down views of the hip     IMAGES: 3     FINDINGS:     There is no acute fracture or dislocation  There is a joint space narrowing through the central aspect of the hip joint no degenerative changes seen within the symphysis pubis     No lytic or blastic lesions are seen  Soft tissues are unremarkable         IMPRESSION:     Joint space narrowing through the central aspect of the hip joint     No acute displaced fracture seen     Narrowing with sclerosis at the symphysis 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

## 2018-01-12 VITALS
TEMPERATURE: 97.4 F | DIASTOLIC BLOOD PRESSURE: 80 MMHG | HEART RATE: 49 BPM | OXYGEN SATURATION: 96 % | SYSTOLIC BLOOD PRESSURE: 128 MMHG | WEIGHT: 173.5 LBS | HEIGHT: 61 IN | BODY MASS INDEX: 32.76 KG/M2

## 2018-01-13 VITALS
SYSTOLIC BLOOD PRESSURE: 132 MMHG | OXYGEN SATURATION: 95 % | BODY MASS INDEX: 32.17 KG/M2 | DIASTOLIC BLOOD PRESSURE: 84 MMHG | HEIGHT: 61 IN | WEIGHT: 170.38 LBS | HEART RATE: 51 BPM

## 2018-01-13 VITALS
HEIGHT: 60 IN | SYSTOLIC BLOOD PRESSURE: 128 MMHG | DIASTOLIC BLOOD PRESSURE: 74 MMHG | TEMPERATURE: 97.5 F | BODY MASS INDEX: 32.39 KG/M2 | HEART RATE: 56 BPM | RESPIRATION RATE: 16 BRPM | WEIGHT: 165 LBS

## 2018-01-13 VITALS
HEART RATE: 63 BPM | DIASTOLIC BLOOD PRESSURE: 84 MMHG | TEMPERATURE: 97.6 F | HEIGHT: 61 IN | WEIGHT: 168 LBS | BODY MASS INDEX: 31.72 KG/M2 | RESPIRATION RATE: 16 BRPM | SYSTOLIC BLOOD PRESSURE: 134 MMHG

## 2018-01-13 VITALS
HEIGHT: 61 IN | DIASTOLIC BLOOD PRESSURE: 68 MMHG | BODY MASS INDEX: 32.31 KG/M2 | WEIGHT: 171.13 LBS | SYSTOLIC BLOOD PRESSURE: 120 MMHG | HEART RATE: 48 BPM | OXYGEN SATURATION: 93 % | TEMPERATURE: 97.2 F

## 2018-01-14 VITALS
SYSTOLIC BLOOD PRESSURE: 120 MMHG | DIASTOLIC BLOOD PRESSURE: 72 MMHG | HEIGHT: 61 IN | WEIGHT: 171 LBS | HEART RATE: 50 BPM | BODY MASS INDEX: 32.28 KG/M2 | TEMPERATURE: 96.4 F | OXYGEN SATURATION: 90 % | RESPIRATION RATE: 18 BRPM

## 2018-01-14 VITALS
DIASTOLIC BLOOD PRESSURE: 74 MMHG | OXYGEN SATURATION: 93 % | HEIGHT: 61 IN | WEIGHT: 171.38 LBS | RESPIRATION RATE: 14 BRPM | SYSTOLIC BLOOD PRESSURE: 116 MMHG | HEART RATE: 48 BPM | BODY MASS INDEX: 32.36 KG/M2

## 2018-01-15 VITALS
RESPIRATION RATE: 14 BRPM | HEART RATE: 58 BPM | HEIGHT: 61 IN | SYSTOLIC BLOOD PRESSURE: 130 MMHG | BODY MASS INDEX: 32 KG/M2 | WEIGHT: 169.5 LBS | OXYGEN SATURATION: 94 % | DIASTOLIC BLOOD PRESSURE: 90 MMHG | TEMPERATURE: 98.4 F

## 2018-01-16 ENCOUNTER — ALLSCRIPTS OFFICE VISIT (OUTPATIENT)
Dept: OTHER | Facility: OTHER | Age: 83
End: 2018-01-16

## 2018-01-17 NOTE — PROGRESS NOTES
Assessment   1  Seborrheic keratosis (702 19) (L82 1)   2  Verrucous keratosis (702 8) (L82 1)   3  Screening for skin condition (V82 0) (Z13 89)   4  H/O nonmelanoma skin cancer (V10 83) (Z85 828)    Plan    · Use a sun block product with an SPF of 15 or more ; Status:Complete;   Done:    49VOP8574   · Follow-up visit in 6 months Evaluation and Treatment  Follow-up  Status: Complete     Done: 07AQM7943   · Wound care as instructed ; Status:Complete;   Done: 93ZQU1441    Discussion/Summary   Discussion Summary- [facility]:      Assessment #1: Seborrheic keratosis  Care Plan:      Patient reassured these are normal growths we acquire with age no treatment needed  Assessment #2: History of skin cancer  Care Plan:      No recurrence nothing else atypical sunblock recommended follow-up in one year  Assessment #3: Screening for dermatologic disorders  Care Plan:      Nothing else of concern noted on complete exam sun protection recommended follow-up yearly  Assessment #4: Verrucous keratosis  Care Plan:      Lesions treated because the patient concern and irritation  Chief Complaint   Chief Complaint Free Text Note Form: Patient is here for growths on her back  Only wanted a gown for the waist up  History of Present Illness   HPI: 72-year-old female with previous history of skin cancer presents for overall checkup complaining of irritated growths present her back that itch and burn      Review of Systems   Complete Female Dermatology [facility]- Est Patient:      Constitutional: Denies constitutional symptoms  Eyes: Denies eye symptoms  ENT:  denies ear symptoms, nasal symptoms, mouth or throat symptoms  Cardiovascular: Denies cardiovascular symptoms  Respiratory: Denies respiratory symptoms  Gastrointestinal: Denies gastrointestinal symptoms  Musculoskeletal: Denies musculoskeletal symptoms        Integumentary: Denies skin, hair and nail symptoms  Neurological: Denies neurologic symptoms  Psychiatric: Denies psychiatric symptoms  Endocrine: Denies endocrine symptoms  Hematologic/Lymphatic: Denies hematologic symptoms  Active Problems   1  Acquired hypothyroidism (244 9) (E03 9)   2  Age related osteoporosis (733 01) (M81 0)   3  Arthritis of right hip (716 95) (M16 11)   4  Benign essential hypertension (401 1) (I10)   5  Blepharitis (373 00) (H01 009)   6  Bradycardia (427 89) (R00 1)   7  Breast cancer (174 9) (C50 919)   8  Elevated blood sugar (790 29) (R73 9)   9  Encounter for screening mammogram for breast cancer (V76 12) (Z12 31)   10  Flu vaccine need (V04 81) (Z23)   11  Food sticks on swallowing (787 20) (R13 10)   12  Groin pain, right (789 09) (R10 31)   13  Hypomagnesemia (275 2) (E83 42)   14  Lightheadedness (780 4) (R42)   15  Malignant neoplasm of skin (173 90) (C44 90)   16  Medicare annual wellness visit, initial (V70 0) (Z00 00)   17  Medication therapy changed (V58 69) (Z79 899)   18  Muscle cramps (729 82) (R25 2)   19  Needs flu shot (V04 81) (Z23)   20  [place] (Z38 2)   21  Ovarian cancer (183 0) (C56 9)   22  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)   23  Pre-operative clearance (V72 84) (Z01 818)   24  Proteinuria (791 0) (R80 9)   25  Ptosis, 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)   26  Rhinitis, chronic (472 0) (J31 0)   27  Screening for malignant neoplasm of colon (V76 51) (Z12 11)   28  Screening for skin condition (V82 0) (Z13 89)   29  Seborrheic keratoses (702 19) (L82 1)   30  Seborrheic keratosis (702 19) (L82 1)   31  Tarry stools (578 1) (K92 1)   32  URI (upper respiratory infection) (465 9) (J06 9)    Past Medical History   1  History of Dysplastic colon polyp (211 3) (K63 5)   2  H/O mammogram (V15 89) (Z92 89)   3  H/O nonmelanoma skin cancer (V10 83) (P96 429)   4  History of chemotherapy (V87 41) (Z92 21)   5  History of colonoscopy (V45 89) (Z98 890)   6   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)   7  History of neoplasm of uncertain behavior of skin (V13 3) (Z86 03)   8  History of ovarian cancer (V10 43) (Z85 43)   9  History of Hormone replacement therapy (HRT) (V07 4) (Z79 890)   10  History of Hypertrophic condition of skin (701 9) (L91 9)   11  History of Malignant neoplasm of areola of right breast in female (174 0) (C50 011)   12  History of Malignant Neoplasm Of Skin Of Face (173 30)   13  History of Ovarian cancer, bilateral (183 0) (C56 1,C56 2)   14  History of Radiation Therapy (V15 3)  Past Medical History Reviewed- Derm:    The past medical history was reviewed  Surgical History   1  History of Excision Of Lesion Face   2  History of Excision Of Lesion Nose   3  History of Excision Of Lesion Trunk   4  History of Knee Surgery   5  History of Radical Total Abdominal Hysterectomy   6  History of Resection Of Ovarian Malig  + BSO, Omentectomy, Debulking   7  History of Shoulder Surgery   8  History of Splenectomy   9  History of Tonsillectomy With Adenoidectomy  Surgical History Reviewed [facility]:    Surgical History reviewed      Family History   Mother    1  Denied: Family history of mental disorder   2  Denied: Family history of Illicit drug use  Family History    3  Family history of No Significant Family History  Family History Reviewed- Derm:    Family History was reviewed      Social History    · Former smoker (J93 38) (U43 070)   ·    · Occasional alcohol consumption (V49 89) (Z78 9)  Social History Reviewed [facility] Derm: The social history was reviewed      Current Meds    1  Aspir-Low 81 MG Oral Tablet Delayed Release Recorded   2  Bisoprolol-Hydrochlorothiazide 2 5-6 25 MG Oral Tablet; TAKE 1 TABLET DAILY AS     DIRECTED; Therapy: 01Sep2017 to (Evaluate:25Xdf9843)  Requested for: 32UQU4259; Last     Rx:27Nov2017 Ordered   3  Calcium 600 TABS; Therapy: (Recorded:13Uyq9104) to Recorded   4   Calcium 600+D 600-400 MG-UNIT Oral Tablet Recorded 5  D-3-5 5000 UNIT Oral Capsule; Take as directed Recorded   6  Felodipine ER 10 MG Oral Tablet Extended Release 24 Hour; TAKE 1 TABLET ONCE     DAILY  Requested for: 20Jun2017; Last Rx:20Jun2017 Ordered   7  Fluticasone Propionate 50 MCG/ACT Nasal Suspension; USE 1 TO 2 SPRAYS IN EACH     NOSTRIL ONCE DAILY; Therapy: 81Fho8921 to (Last Jessica Loots)  Requested for: 20Jun2017 Ordered   8  HydroCHLOROthiazide 12 5 MG Oral Tablet; TAKE 1 TABLET DAILY AS NEEDED      Requested for: 20Jun2017; Last Rx:20Jun2017 Ordered   9  Irbesartan 300 MG Oral Tablet; TAKE 1 TABLET DAILY  Requested for: 20Jun2017; Last     Rx:66Xuj4309 Ordered   10  Levothyroxine Sodium 125 MCG Oral Tablet; TAKE 1 TABLET DAILY AS DIRECTED       Requested for: 44FBB5538; Last Rx:70Hau9905 Ordered   11  Magnesium 500 MG Oral Capsule; One po BID; Therapy: 12Zvo5682 to (Last Rx:31Ovo2480) Ordered   12  Omega-3 1000 MG Oral Capsule Recorded   13  Systane Ultra SOLN Recorded  Medication List Reviewed: The medication list was reviewed and updated today  Allergies   1  No Known Drug Allergies    Physical Exam        Constitutional      General appearance: Appears healthy and well developed  Lymphatic      No visible disturbance  Musculoskeletal      Digits and nails: No clubbing, cyanosis or edema  Cutaneous and nail exam normal        Skin      Scalp skin texture and hair distribution: Normal skin texture on scalp, normal hair distribution  Head: Normal turgor, no rashes, no lesions  Neck: Normal turgor, no rashes, no lesions  Chest: Normal turgor, no rashes, no lesions  Abdomen: Normal turgor, no rashes, no lesions  Back: Abnormal        Right upper extremity: Normal turgor, no rashes, no lesions  Left upper extremity: Normal turgor, no rashes, no lesions  Right lower extremity: Normal turgor, no rashes, no lesions  Left lower extremity: Normal turgor, no rashes, no lesions  Neuro/Psych      Alert and oriented x 3  Displays comfort and cooperation during encounterl  Affect is normal        Finding Previous sites of skin cancer well healed without recurrence normal keratotic papules with greasy stuck on appearance inflamed keratotic papules noted on the small of the back x7 nothing else atypical noted on exam       Procedure        Procedure: destruction of lesion  Indications for the procedure include Verrucous keratosis  Risks, benefits, alternatives, infection risk, bleeding risk, risk of allergic reaction and the risk of scarring were discussed with the patient--   verbal consent was obtained prior to the procedure  Procedure Note:      The lesion location: Mid lower back  Destruction Technique: cryotherapy with liquid nitrogen application-- and-- 66-07 seconds via cryospray--   Destruction of 7 lesions  Post-Procedure:      Patient Status: the patient tolerated the procedure well  Complications: there were no complications  Future Appointments      Date/Time Provider Specialty Site   02/02/2018 02:00 PM Poli Buitrago, [address] [facility]   03/16/2018 01:00 PM Elver Bassett [facility]   05/24/2018 10:30 AM Apoorva Alvarado [facility] Gynecological Oncology [facility] GYN ONCOLOGY [facility]   07/30/2018 02:35 PM PHU Murillo   [facility]     Signatures    Electronically signed by : PHU Glynn ; Jan 16 2018 10:57AM EST                       (Author)

## 2018-01-22 VITALS
BODY MASS INDEX: 31.91 KG/M2 | HEIGHT: 61 IN | OXYGEN SATURATION: 93 % | WEIGHT: 169 LBS | SYSTOLIC BLOOD PRESSURE: 120 MMHG | HEART RATE: 50 BPM | RESPIRATION RATE: 16 BRPM | DIASTOLIC BLOOD PRESSURE: 80 MMHG

## 2018-01-22 VITALS
BODY MASS INDEX: 32.21 KG/M2 | SYSTOLIC BLOOD PRESSURE: 104 MMHG | HEIGHT: 60 IN | RESPIRATION RATE: 15 BRPM | OXYGEN SATURATION: 89 % | DIASTOLIC BLOOD PRESSURE: 68 MMHG | WEIGHT: 164.05 LBS | TEMPERATURE: 97.5 F | HEART RATE: 58 BPM

## 2018-01-23 VITALS
RESPIRATION RATE: 14 BRPM | DIASTOLIC BLOOD PRESSURE: 74 MMHG | TEMPERATURE: 97.5 F | SYSTOLIC BLOOD PRESSURE: 140 MMHG | OXYGEN SATURATION: 93 % | WEIGHT: 168.25 LBS | HEART RATE: 56 BPM | HEIGHT: 60 IN | BODY MASS INDEX: 33.03 KG/M2

## 2018-01-30 DIAGNOSIS — E03.9 HYPOTHYROIDISM, UNSPECIFIED TYPE: ICD-10-CM

## 2018-01-30 DIAGNOSIS — I10 HYPERTENSION, UNSPECIFIED TYPE: Primary | ICD-10-CM

## 2018-01-31 RX ORDER — IRBESARTAN 150 MG/1
300 TABLET ORAL
Qty: 30 TABLET | Refills: 3 | OUTPATIENT
Start: 2018-01-31

## 2018-01-31 RX ORDER — FELODIPINE 10 MG/1
10 TABLET, EXTENDED RELEASE ORAL DAILY
Qty: 30 TABLET | Refills: 3 | OUTPATIENT
Start: 2018-01-31

## 2018-01-31 RX ORDER — BISOPROLOL FUMARATE AND HYDROCHLOROTHIAZIDE 2.5; 6.25 MG/1; MG/1
1 TABLET ORAL DAILY
Qty: 30 TABLET | Refills: 3 | OUTPATIENT
Start: 2018-01-31

## 2018-01-31 RX ORDER — LEVOTHYROXINE SODIUM 0.12 MG/1
125 TABLET ORAL DAILY
Qty: 30 TABLET | Refills: 3 | OUTPATIENT
Start: 2018-01-31

## 2018-01-31 RX ORDER — HYDROCHLOROTHIAZIDE 12.5 MG/1
12.5 TABLET ORAL DAILY PRN
Qty: 30 TABLET | Refills: 3 | OUTPATIENT
Start: 2018-01-31

## 2018-02-09 DIAGNOSIS — J31.0 CHRONIC RHINITIS, UNSPECIFIED TYPE: Primary | ICD-10-CM

## 2018-02-09 RX ORDER — FLUTICASONE PROPIONATE 50 MCG
1 SPRAY, SUSPENSION (ML) NASAL DAILY
Qty: 16 G | Refills: 3 | Status: SHIPPED | OUTPATIENT
Start: 2018-02-09 | End: 2018-02-16 | Stop reason: SDUPTHER

## 2018-02-16 ENCOUNTER — TELEPHONE (OUTPATIENT)
Dept: FAMILY MEDICINE CLINIC | Facility: CLINIC | Age: 83
End: 2018-02-16

## 2018-02-16 ENCOUNTER — OFFICE VISIT (OUTPATIENT)
Dept: FAMILY MEDICINE CLINIC | Facility: CLINIC | Age: 83
End: 2018-02-16
Payer: COMMERCIAL

## 2018-02-16 VITALS
DIASTOLIC BLOOD PRESSURE: 78 MMHG | HEART RATE: 58 BPM | WEIGHT: 168 LBS | BODY MASS INDEX: 31.72 KG/M2 | TEMPERATURE: 97.7 F | OXYGEN SATURATION: 90 % | RESPIRATION RATE: 13 BRPM | HEIGHT: 61 IN | SYSTOLIC BLOOD PRESSURE: 134 MMHG

## 2018-02-16 DIAGNOSIS — J31.0 CHRONIC RHINITIS, UNSPECIFIED TYPE: Primary | ICD-10-CM

## 2018-02-16 DIAGNOSIS — J06.9 ACUTE URI: Primary | ICD-10-CM

## 2018-02-16 PROCEDURE — 99213 OFFICE O/P EST LOW 20 MIN: CPT | Performed by: FAMILY MEDICINE

## 2018-02-16 RX ORDER — FOLIC ACID 0.8 MG
TABLET ORAL 2 TIMES DAILY
COMMUNITY
Start: 2017-09-19 | End: 2018-05-24 | Stop reason: HOSPADM

## 2018-02-16 RX ORDER — FLUTICASONE PROPIONATE 50 MCG
1 SPRAY, SUSPENSION (ML) NASAL DAILY
Qty: 16 G | Refills: 0 | Status: SHIPPED | OUTPATIENT
Start: 2018-02-16 | End: 2018-07-13 | Stop reason: SDUPTHER

## 2018-02-16 RX ORDER — LEVOTHYROXINE SODIUM 0.12 MG/1
1 TABLET ORAL DAILY
COMMUNITY

## 2018-02-16 RX ORDER — AZITHROMYCIN 250 MG/1
TABLET, FILM COATED ORAL
Qty: 6 TABLET | Refills: 0 | Status: SHIPPED | OUTPATIENT
Start: 2018-02-16 | End: 2018-02-20

## 2018-02-16 RX ORDER — FLUTICASONE PROPIONATE 50 MCG
2 SPRAY, SUSPENSION (ML) NASAL DAILY
Qty: 16 G | Refills: 0 | Status: SHIPPED | OUTPATIENT
Start: 2018-02-16 | End: 2018-02-27 | Stop reason: SDUPTHER

## 2018-02-16 RX ORDER — FLUTICASONE PROPIONATE 50 MCG
2 SPRAY, SUSPENSION (ML) NASAL DAILY
COMMUNITY
Start: 2016-09-13 | End: 2018-02-16 | Stop reason: SDUPTHER

## 2018-02-16 RX ORDER — FELODIPINE 10 MG/1
1 TABLET, EXTENDED RELEASE ORAL DAILY
COMMUNITY
End: 2018-07-09 | Stop reason: SDUPTHER

## 2018-02-16 RX ORDER — ASPIRIN 81 MG/1
TABLET ORAL
COMMUNITY
End: 2018-02-16 | Stop reason: SDUPTHER

## 2018-02-16 RX ORDER — DEXTROMETHORPHAN HYDROBROMIDE AND PROMETHAZINE HYDROCHLORIDE 15; 6.25 MG/5ML; MG/5ML
5 SYRUP ORAL 4 TIMES DAILY PRN
Qty: 240 ML | Refills: 0 | Status: SHIPPED | OUTPATIENT
Start: 2018-02-16 | End: 2018-02-27 | Stop reason: SDUPTHER

## 2018-02-16 RX ORDER — HYDROCHLOROTHIAZIDE 12.5 MG/1
1 TABLET ORAL DAILY PRN
COMMUNITY
End: 2018-09-10 | Stop reason: SDUPTHER

## 2018-02-16 RX ORDER — CHLORAL HYDRATE 500 MG
CAPSULE ORAL
COMMUNITY

## 2018-02-16 RX ORDER — LOTEPREDNOL ETABONATE 2 MG/ML
SUSPENSION/ DROPS OPHTHALMIC
Refills: 3 | COMMUNITY
Start: 2017-12-15

## 2018-02-16 RX ORDER — BISOPROLOL FUMARATE AND HYDROCHLOROTHIAZIDE 2.5; 6.25 MG/1; MG/1
1 TABLET ORAL DAILY
COMMUNITY
Start: 2017-09-01 | End: 2018-07-09 | Stop reason: SDUPTHER

## 2018-02-16 RX ORDER — IRBESARTAN 300 MG/1
1 TABLET ORAL DAILY
COMMUNITY
End: 2018-07-13 | Stop reason: SDUPTHER

## 2018-02-16 NOTE — PROGRESS NOTES
Assessment/Plan:           Problem List Items Addressed This Visit     None      Visit Diagnoses     Acute URI    -  Primary    Relevant Medications    azithromycin (ZITHROMAX) 250 mg tablet    promethazine-dextromethorphan (PHENERGAN-DM) 6 25-15 mg/5 mL oral syrup            Subjective:      Patient ID: Ulysses Legions is a [de-identified] y o  female  Patient comes in with 2 history of sore throat and cough  She is concerned since she has had splenectomy  The following portions of the patient's history were reviewed and updated as appropriate: allergies, current medications, past family history, past medical history, past social history, past surgical history and problem list     Review of Systems   Constitutional: Negative  HENT: Positive for sore throat  Respiratory: Positive for cough  Gastrointestinal: Negative  Objective:      /78   Pulse 58   Temp 97 7 °F (36 5 °C)   Resp 13   Ht 5' 1" (1 549 m)   Wt 76 2 kg (168 lb)   SpO2 90%   BMI 31 74 kg/m²          Physical Exam   Constitutional: She is oriented to person, place, and time  She appears well-developed and well-nourished  HENT:   Head: Normocephalic and atraumatic  Posterior oropharynx is injected   Eyes: EOM are normal  Pupils are equal, round, and reactive to light  Neck: Neck supple  Cardiovascular: Normal rate, regular rhythm and normal heart sounds  Pulmonary/Chest: Effort normal and breath sounds normal    Abdominal: Soft  Musculoskeletal: Normal range of motion  Lymphadenopathy:     She has no cervical adenopathy  Neurological: She is alert and oriented to person, place, and time  Skin: Skin is warm and dry  Psychiatric: She has a normal mood and affect  Nursing note and vitals reviewed

## 2018-02-16 NOTE — TELEPHONE ENCOUNTER
Patient needs a refill of her Flonase  Please send to [facility]    Thank you, Elsie Enrique

## 2018-02-27 ENCOUNTER — OFFICE VISIT (OUTPATIENT)
Dept: FAMILY MEDICINE CLINIC | Facility: CLINIC | Age: 83
End: 2018-02-27
Payer: COMMERCIAL

## 2018-02-27 VITALS
HEIGHT: 60 IN | SYSTOLIC BLOOD PRESSURE: 140 MMHG | BODY MASS INDEX: 32 KG/M2 | WEIGHT: 163 LBS | OXYGEN SATURATION: 91 % | TEMPERATURE: 97.1 F | DIASTOLIC BLOOD PRESSURE: 70 MMHG | RESPIRATION RATE: 16 BRPM | HEART RATE: 56 BPM

## 2018-02-27 DIAGNOSIS — J06.9 UPPER RESPIRATORY TRACT INFECTION, UNSPECIFIED TYPE: Primary | ICD-10-CM

## 2018-02-27 DIAGNOSIS — J06.9 ACUTE URI: ICD-10-CM

## 2018-02-27 PROCEDURE — 99213 OFFICE O/P EST LOW 20 MIN: CPT | Performed by: FAMILY MEDICINE

## 2018-02-27 RX ORDER — DEXTROMETHORPHAN HYDROBROMIDE AND PROMETHAZINE HYDROCHLORIDE 15; 6.25 MG/5ML; MG/5ML
5 SYRUP ORAL 4 TIMES DAILY PRN
Qty: 240 ML | Refills: 0 | Status: SHIPPED | OUTPATIENT
Start: 2018-02-27 | End: 2018-02-27 | Stop reason: ALTCHOICE

## 2018-02-27 RX ORDER — GUAIFENESIN AND CODEINE PHOSPHATE 100; 10 MG/5ML; MG/5ML
5 SOLUTION ORAL 3 TIMES DAILY PRN
Qty: 240 ML | Refills: 0 | Status: SHIPPED | OUTPATIENT
Start: 2018-02-27 | End: 2018-03-14 | Stop reason: CLARIF

## 2018-02-27 NOTE — PATIENT INSTRUCTIONS
Use the cough medicines as directed when your home  This may make you drowsy so you do not want to take this when your out  Plenty of liquids, tea with honey, rest at home  Call by Friday if not better will dispense doxycycline prior to the weekend  If you think you are wheezing you need to let me know as I might have to give you an inhaler but right now there is no hint of a wheeze  Upper Respiratory Infection   [place]: Pharyngitis/Tonsillitis  In: Nicole SORIANO, ed  Nicole's Clinical Advisor 2017, [place], [place], 2017  Sunny AM, Maria Esther Benton, & Chris A: Appropriate Antibiotic Use for Acute Respiratory Tract Infection in Adults: Advice for High-Value Care From the [place] [address] for Disease Control and Prevention  Shelia Intern Med 2016; Epub:Epub  Landry Caul: Exposure to cold and acute upper respiratory tract infection  Rhinology 2015; 53(2):  Centers for Disease Control and Prevention (CDC): Nonspecific upper respiratory tract infection: physician information sheet (adults)  Centers for Disease Control and Prevention (CDC)  [place], [address] [place] [address],[address]  Available from URL: MrFebruary uy  As accessed 2017-01-19  Naz MORAN & Krishan Romo AM: Upper Respiratory Infection (URI)  In: Charles FOWLER, ed  The 5-Minute Clinical Consult Standard 2016, 24th ed  [address] [facility] [place], [place], 59  FamilyDoctor  org: Colds and the flu: treatment  American Academy of [facility]  2014  Available from URL: http://familydoctor  org/familydoctor/en/diseases-conditions/colds-and-the-flu/treatment html  As accessed 2015-06-03  © 2017 [facility] Information is for End User's use only and may not be sold, redistributed or otherwise used for commercial purposes   All illustrations and images included in CareNotes® are the copyrighted property of A  D A M , Inc  or Boris Heredia  The above information is an  only  It is not intended as medical advice for individual conditions or treatments  Talk to your doctor, nurse or pharmacist before following any medical regimen to see if it is safe and effective for you

## 2018-02-27 NOTE — PROGRESS NOTES
Assessment/Plan:     Chronic Problems:  No problem-specific Assessment & Plan notes found for this encounter  Visit Diagnosis:  Diagnoses and all orders for this visit:    Upper respiratory tract infection, unspecified type  Comments:  I believe you have a viral infection  Take the cough medicine, plenty of liquids, tea with honey, call here by Friday if still coughing will give another abx/   Orders:  -     guaifenesin-codeine (GUAIFENESIN AC) 100-10 MG/5ML liquid; Take 5 mL by mouth 3 (three) times a day as needed for cough    Acute URI  -     Discontinue: promethazine-dextromethorphan (PHENERGAN-DM) 6 25-15 mg/5 mL oral syrup; Take 5 mL by mouth 4 (four) times a day as needed for cough          Subjective:    Patient ID: Derek Bowman is a [de-identified] y o  female  Sick since the 14th of February  Started with itchy throat and then developed hacking cough  Seen here last week and was worried that she may have gotten a new baby sick  Seen by Dr Isaiah Angela and given an abx and cough syrup  Does not think it helped  Cough is productive with heavy mucous  Yellow in color  No sob, but coughed so hard it was pink in color  No chest pains with the cough  Finished the abx and the cough meds  Takes all other meds as directed  No side effects noted           The following portions of the patient's history were reviewed and updated as appropriate: allergies, current medications, past family history, past medical history, past social history, past surgical history and problem list     Review of Systems   Constitutional: Negative for chills, fatigue and fever  HENT: Positive for postnasal drip and sneezing (early on, but now just a cough  )  Negative for ear pain, sinus pain and sinus pressure  Eyes: Negative  Respiratory: Positive for cough and wheezing  Negative for chest tightness and shortness of breath  Cardiovascular: Negative for chest pain  Gastrointestinal: Negative for constipation, diarrhea and nausea  Musculoskeletal: Negative for arthralgias and myalgias  Neurological: Negative for dizziness and headaches  /70   Pulse 56   Temp (!) 97 1 °F (36 2 °C)   Resp 16   Ht 5' (1 524 m)   Wt 73 9 kg (163 lb)   SpO2 91%   BMI 31 83 kg/m²   Social History     Social History    Marital status: /Civil Union     Spouse name: N/A    Number of children: N/A    Years of education: N/A     Occupational History    Not on file  Social History Main Topics    Smoking status: Never Smoker    Smokeless tobacco: Never Used      Comment: former smoker- per all scripts    Alcohol use No      Comment: occassional    Drug use: No    Sexual activity: Not on file     Other Topics Concern    Not on file     Social History Narrative    No narrative on file     Past Medical History:   Diagnosis Date    [facility] ([facility] )     Dysplastic colon polyp     History of chemotherapy     Hx of colonoscopy     Hx of radiation therapy     Hypertension     Hypertrophic condition of skin     Malignant neoplasm of skin of face     [facility]     Neoplasm of uncertain behavior of skin     Nonmelanoma skin cancer     last assessed 1/16/18    Ovarian cancer ([facility] )     last assessed 12/20/16 bilateral     No family history on file    Past Surgical History:   Procedure Laterality Date    KNEE SURGERY      OMENTECTOMY      resection of ovarin malig + BSO, debulking    SHOULDER SURGERY      SKIN LESION EXCISION  08/09/2002    forehead-irritated seborrheic keratosis    SKIN LESION EXCISION  04/26/2011    nose- r/o bcc    SPLENECTOMY      TONSILECTOMY AND ADNOIDECTOMY      TOTAL ABDOMINAL HYSTERECTOMY      radical    TRUNK SKIN LESION EXCISIONAL BIOPSY  04/09/2008    skin -chest-r/o bcc       Current Outpatient Prescriptions:     aspirin (ECOTRIN LOW STRENGTH) 81 mg EC tablet, Take 81 mg by mouth daily, Disp: , Rfl:     bisoprolol-hydrochlorothiazide (ZIAC) 2 5-6 25 MG per tablet, Take 1 tablet by mouth daily, Disp: , Rfl:     Calcium Carbonate-Vitamin D (CALCIUM 600+D) 600-400 MG-UNIT per tablet, Take by mouth, Disp: , Rfl:     felodipine (PLENDIL) 10 MG 24 hr tablet, Take 1 tablet by mouth daily, Disp: , Rfl:     fluticasone (FLONASE) 50 mcg/act nasal spray, 1 spray into each nostril daily, Disp: 16 g, Rfl: 0    hydrochlorothiazide (HYDRODIURIL) 12 5 mg tablet, Take 1 tablet by mouth daily as needed, Disp: , Rfl:     irbesartan (AVAPRO) 300 mg tablet, Take 1 tablet by mouth daily, Disp: , Rfl:     levothyroxine 125 mcg tablet, Take 1 tablet by mouth daily, Disp: , Rfl:     Magnesium 500 MG CAPS, Take by mouth 2 (two) times a day, Disp: , Rfl:     Omega-3 1000 MG CAPS, Take by mouth, Disp: , Rfl:     polyethylene glycol-propylene glycol (SYSTANE ULTRA) 0 4-0 3 %, Apply to eye, Disp: , Rfl:     ALREX 0 2 % SUSP, INSTILL 1 DROP IN BOTH EYES EVERY OTHER DAY, Disp: , Rfl: 3    guaifenesin-codeine (GUAIFENESIN AC) 100-10 MG/5ML liquid, Take 5 mL by mouth 3 (three) times a day as needed for cough, Disp: 240 mL, Rfl: 0      Lab Review   Admission on 11/24/2017, Discharged on 11/24/2017   Component Date Value    WBC 11/24/2017 9 30     RBC 11/24/2017 4 82     Hemoglobin 11/24/2017 15 4     Hematocrit 11/24/2017 45 3     MCV 11/24/2017 94     MCH 11/24/2017 32 0     MCHC 11/24/2017 34 0     RDW 11/24/2017 13 3     MPV 11/24/2017 10 1     Platelets 87/70/2211 355     nRBC 11/24/2017 0     Neutrophils Relative 11/24/2017 50     Lymphocytes Relative 11/24/2017 33     Monocytes Relative 11/24/2017 12     Eosinophils Relative 11/24/2017 4     Basophils Relative 11/24/2017 1     Neutrophils Absolute 11/24/2017 4 60     Lymphocytes Absolute 11/24/2017 3 09     Monocytes Absolute 11/24/2017 1 14     Eosinophils Absolute 11/24/2017 0 40     Basophils Absolute 11/24/2017 0 05     Sodium 11/24/2017 141     Potassium 11/24/2017 3 5     Chloride 11/24/2017 101     CO2 11/24/2017 33*    Anion Gap 11/24/2017 7     BUN 11/24/2017 18     Creatinine 11/24/2017 0 66     Glucose 11/24/2017 91     Calcium 11/24/2017 9 9     AST 11/24/2017 18     ALT 11/24/2017 22     Alkaline Phosphatase 11/24/2017 55     Total Protein 11/24/2017 7 6     Albumin 11/24/2017 3 5     Total Bilirubin 11/24/2017 0 40     eGFR 11/24/2017 84     Ventricular Rate 11/24/2017 47     Atrial Rate 11/24/2017 47     PA Interval 11/24/2017 170     QRSD Interval 11/24/2017 90     QT Interval 11/24/2017 470     QTC Interval 11/24/2017 415     P Axis 11/24/2017 62     QRS Axis 11/24/2017 2     T Wave [place] 11/24/2017 39    Appointment on 11/16/2017   Component Date Value     11/16/2017 7 1         Imaging: No results found  Objective:     Physical Exam   Constitutional: She is oriented to person, place, and time  HENT:   Head: Normocephalic  Right Ear: External ear normal    Left Ear: External ear normal    Eyes: EOM are normal  Pupils are equal, round, and reactive to light  Right eye exhibits no discharge  Neck: Neck supple  No tracheal deviation present  No thyromegaly present  Cardiovascular: Normal rate  Murmur (Grade 1/6 systolic murmur) heard  Pulmonary/Chest: No respiratory distress  She has no wheezes  She has no rales  Very congested cough, with clear lungs bilaterally  Abdominal: Soft  There is no tenderness  Musculoskeletal: Normal range of motion  Lymphadenopathy:     She has no cervical adenopathy  Neurological: She is alert and oriented to person, place, and time  Skin: Skin is warm and dry  Psychiatric: She has a normal mood and affect  Patient Instructions     Use the cough medicines as directed when your home  This may make you drowsy so you do not want to take this when your out  Plenty of liquids, tea with honey, rest at home  Call by Friday if not better will dispense doxycycline prior to the weekend    If you think you are wheezing you need to let me know as I might have to give you an inhaler but right now there is no hint of a wheeze  Upper Respiratory Infection   [place]: Pharyngitis/Tonsillitis  In: Nicole SORIANO, ed  Nicole's Clinical Advisor 2017, [place], [place], 2017  Sunny AM, Jorge L Delacruz, & Chris A: Appropriate Antibiotic Use for Acute Respiratory Tract Infection in Adults: Advice for High-Value Care From the [place] [id number] for Disease Control and Prevention  Shelia Intern Med 2016; Epub:Epub  David Pleasure: Exposure to cold and acute upper respiratory tract infection  Rhinology 2015; 53(2):  Centers for Disease Control and Prevention (CDC): Nonspecific upper respiratory tract infection: physician information sheet (adults)  Centers for Disease Control and Prevention (CDC)  [place], [address] [place] [address],[address]  Available from URL: MrFebruary uy  As accessed 2017-01-19  Naz MORAN & Marlo Harkins AM: Upper Respiratory Infection (URI)  In: Charles FOWLER, ed  The 5-Minute Clinical Consult Standard 2016, 24th ed  [address] [facility] [place], [place], [facility]  [email or web address]  org: Colds and the flu: treatment  [employer]  Luz Maria Line  2014  Available from URL: http://[email or web address]  org/familydoctor/en/diseases-conditions/colds-and-the-flu/treatment html  As accessed 2015-06-03 © 2017 [address] [facility] Information is for End User's use only and may not be sold, redistributed or otherwise used for commercial purposes  All illustrations and images included in CareNotes® are the copyrighted property of [employer]  or Boris Heredia  The above information is an  only  It is not intended as medical advice for individual conditions or treatments   Talk to your doctor, nurse or pharmacist before following any medical regimen to see if it is safe and effective for you          Follow up as  previously scheduled however call here by Friday if no better    RD Salinas

## 2018-03-14 ENCOUNTER — TRANSCRIBE ORDERS (OUTPATIENT)
Dept: ADMINISTRATIVE | Facility: HOSPITAL | Age: 83
End: 2018-03-14

## 2018-03-14 ENCOUNTER — APPOINTMENT (OUTPATIENT)
Dept: LAB | Facility: HOSPITAL | Age: 83
End: 2018-03-14
Payer: COMMERCIAL

## 2018-03-14 DIAGNOSIS — E03.9 HYPOTHYROIDISM: ICD-10-CM

## 2018-03-14 DIAGNOSIS — I10 ESSENTIAL (PRIMARY) HYPERTENSION: ICD-10-CM

## 2018-03-14 LAB
ALBUMIN SERPL BCP-MCNC: 3.4 G/DL (ref 3.5–5)
ALP SERPL-CCNC: 57 U/L (ref 46–116)
ALT SERPL W P-5'-P-CCNC: 24 U/L (ref 12–78)
ANION GAP SERPL CALCULATED.3IONS-SCNC: 6 MMOL/L (ref 4–13)
AST SERPL W P-5'-P-CCNC: 20 U/L (ref 5–45)
BILIRUB SERPL-MCNC: 0.5 MG/DL (ref 0.2–1)
BILIRUB UR QL STRIP: NEGATIVE
BUN SERPL-MCNC: 12 MG/DL (ref 5–25)
CALCIUM SERPL-MCNC: 9.9 MG/DL (ref 8.3–10.1)
CHLORIDE SERPL-SCNC: 102 MMOL/L (ref 100–108)
CLARITY UR: CLEAR
CO2 SERPL-SCNC: 32 MMOL/L (ref 21–32)
COLOR UR: YELLOW
CREAT SERPL-MCNC: 0.64 MG/DL (ref 0.6–1.3)
GFR SERPL CREATININE-BSD FRML MDRD: 85 ML/MIN/1.73SQ M
GLUCOSE SERPL-MCNC: 100 MG/DL (ref 65–140)
GLUCOSE UR STRIP-MCNC: NEGATIVE MG/DL
HGB UR QL STRIP.AUTO: NEGATIVE
KETONES UR STRIP-MCNC: NEGATIVE MG/DL
LEUKOCYTE ESTERASE UR QL STRIP: NEGATIVE
NITRITE UR QL STRIP: NEGATIVE
PH UR STRIP.AUTO: 7.5 [PH] (ref 4.5–8)
POTASSIUM SERPL-SCNC: 4.7 MMOL/L (ref 3.5–5.3)
PROT SERPL-MCNC: 7.8 G/DL (ref 6.4–8.2)
PROT UR STRIP-MCNC: NEGATIVE MG/DL
SODIUM SERPL-SCNC: 140 MMOL/L (ref 136–145)
SP GR UR STRIP.AUTO: 1.01 (ref 1–1.03)
TSH SERPL DL<=0.05 MIU/L-ACNC: 1.73 UIU/ML (ref 0.36–3.74)
UROBILINOGEN UR QL STRIP.AUTO: 0.2 E.U./DL

## 2018-03-14 PROCEDURE — 80053 COMPREHEN METABOLIC PANEL: CPT

## 2018-03-14 PROCEDURE — 84443 ASSAY THYROID STIM HORMONE: CPT

## 2018-03-14 PROCEDURE — 36415 COLL VENOUS BLD VENIPUNCTURE: CPT

## 2018-03-14 PROCEDURE — 81003 URINALYSIS AUTO W/O SCOPE: CPT

## 2018-03-15 DIAGNOSIS — E03.9 HYPOTHYROIDISM: ICD-10-CM

## 2018-03-15 DIAGNOSIS — I10 ESSENTIAL (PRIMARY) HYPERTENSION: ICD-10-CM

## 2018-03-16 ENCOUNTER — OFFICE VISIT (OUTPATIENT)
Dept: FAMILY MEDICINE CLINIC | Facility: CLINIC | Age: 83
End: 2018-03-16
Payer: COMMERCIAL

## 2018-03-16 VITALS
HEART RATE: 68 BPM | OXYGEN SATURATION: 91 % | BODY MASS INDEX: 31.95 KG/M2 | SYSTOLIC BLOOD PRESSURE: 110 MMHG | TEMPERATURE: 97.1 F | RESPIRATION RATE: 16 BRPM | DIASTOLIC BLOOD PRESSURE: 70 MMHG | HEIGHT: 61 IN | WEIGHT: 169.2 LBS

## 2018-03-16 DIAGNOSIS — R01.1 HEART MURMUR: Primary | ICD-10-CM

## 2018-03-16 DIAGNOSIS — I10 ESSENTIAL HYPERTENSION: ICD-10-CM

## 2018-03-16 DIAGNOSIS — C50.912 MALIGNANT NEOPLASM OF LEFT FEMALE BREAST, UNSPECIFIED ESTROGEN RECEPTOR STATUS, UNSPECIFIED SITE OF BREAST (HCC): ICD-10-CM

## 2018-03-16 DIAGNOSIS — E83.42 HYPOMAGNESEMIA: ICD-10-CM

## 2018-03-16 DIAGNOSIS — C56.9 MALIGNANT NEOPLASM OF OVARY, UNSPECIFIED LATERALITY (HCC): ICD-10-CM

## 2018-03-16 PROCEDURE — 3725F SCREEN DEPRESSION PERFORMED: CPT | Performed by: FAMILY MEDICINE

## 2018-03-16 PROCEDURE — 3074F SYST BP LT 130 MM HG: CPT | Performed by: FAMILY MEDICINE

## 2018-03-16 PROCEDURE — 1101F PT FALLS ASSESS-DOCD LE1/YR: CPT | Performed by: FAMILY MEDICINE

## 2018-03-16 PROCEDURE — 3078F DIAST BP <80 MM HG: CPT | Performed by: FAMILY MEDICINE

## 2018-03-16 PROCEDURE — 99214 OFFICE O/P EST MOD 30 MIN: CPT | Performed by: FAMILY MEDICINE

## 2018-03-16 NOTE — PROGRESS NOTES
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, unspecified estrogen receptor status, unspecified site of breast ([facility] )  Comments:  Pt was followed by Dr Arlyn Bolton, but prefers not follow anymore  Malignant neoplasm of ovary, unspecified laterality [facility])  Comments:    Keep the follow-up with Dr Nell Gosselin  Hypomagnesemia  -     Magnesium; Future          Subjective:    Patient ID: Octavio Espana is a [de-identified] y o  female  Pt is here for routine f/u  Had labs done and here to discuss results  Finally starting to get over her cough that she had for 4 weeks  Does not feel sick anymore  Feels she has no complaints today  Still on magnesium, put on this by Dr Wynn Claude  Has been on this for years  Not sure why it was started and plans on discontinuing after she is finished with current bottle  Takes all other meds as directed  No side effects noted  Checks her bp's at home and reports they are "good"  The following portions of the patient's history were reviewed and updated as appropriate: allergies, current medications, past family history, past medical history, past social history, past surgical history and problem list     Review of Systems   Constitutional: Negative for chills, fatigue and fever  HENT: Negative  Eyes:        Always with dry eyes   Respiratory: Negative for cough, shortness of breath and wheezing  Cardiovascular: Negative for chest pain, palpitations and leg swelling  Gastrointestinal: Negative for constipation, diarrhea, nausea and vomiting  Genitourinary: Negative for dysuria and frequency  Had incontinence with the cough      Musculoskeletal: Positive for arthralgias and myalgias ( Occasionally will get a muscle spasm in the right side of her neck which will be very painful  Resolve spontaneously in seconds)  Skin:        Just had her mammo  Follows with Dr Anna Becker  Neurological: Negative for dizziness, numbness and headaches  Psychiatric/Behavioral: Negative for dysphoric mood  The patient is not nervous/anxious  /70   Pulse 68   Temp (!) 97 1 °F (36 2 °C)   Resp 16   Ht 5' 1" (1 549 m)   Wt 76 7 kg (169 lb 3 2 oz)   SpO2 91%   BMI 31 97 kg/m²   Social History     Social History    Marital status: /Civil Union     Spouse name: N/A    Number of children: N/A    Years of education: N/A     Occupational History    Not on file  Social History Main Topics    Smoking status: Never Smoker    Smokeless tobacco: Never Used      Comment: former smoker- per all scripts    Alcohol use No      Comment: occassional    Drug use: No    Sexual activity: Not on file     Other Topics Concern    Not on file     Social History Narrative    No narrative on file     Past Medical History:   Diagnosis Date    Cancer ([facility] )     Dysplastic colon polyp     History of chemotherapy     Hx of colonoscopy     Hx of radiation therapy     Hypertension     Hypertrophic condition of skin     Malignant neoplasm of skin of face     [facility]     Neoplasm of uncertain behavior of skin     Nonmelanoma skin cancer     last assessed 1/16/18    Ovar[facility] )     last assessed 12/20/16 bilateral     No family history on file    Past Surgical History:   Procedure Laterality Date    KNEE SURGERY      OMENTECTOMY      resection of ovarin malig + BSO, debulking    SHOULDER SURGERY      SKIN LESION EXCISION  08/09/2002    forehead-irritated seborrheic keratosis    SKIN LESION EXCISION  04/26/2011    nose- r/o bcc    SPLENECTOMY      TONSILECTOMY AND ADNOIDECTOMY      TOTAL ABDOMINAL HYSTERECTOMY      radical    TRUNK SKIN LESION EXCISIONAL BIOPSY  04/09/2008    skin -chest-r/o bcc       Current Outpatient Prescriptions:     ALREX 0 2 % SUSP, INSTILL 1 DROP IN BOTH EYES EVERY OTHER DAY, Disp: , Rfl: 3    aspirin (ECOTRIN LOW STRENGTH) 81 mg EC tablet, Take 81 mg by mouth daily, Disp: , Rfl:     bisoprolol-hydrochlorothiazide (ZIAC) 2 5-6 25 MG per tablet, Take 1 tablet by mouth daily, Disp: , Rfl:     Calcium Carbonate-Vitamin D (CALCIUM 600+D) 600-400 MG-UNIT per tablet, Take by mouth, Disp: , Rfl:     felodipine (PLENDIL) 10 MG 24 hr tablet, Take 1 tablet by mouth daily, Disp: , Rfl:     fluticasone (FLONASE) 50 mcg/act nasal spray, 1 spray into each nostril daily, Disp: 16 g, Rfl: 0    hydrochlorothiazide (HYDRODIURIL) 12 5 mg tablet, Take 1 tablet by mouth daily as needed, Disp: , Rfl:     irbesartan (AVAPRO) 300 mg tablet, Take 1 tablet by mouth daily, Disp: , Rfl:     levothyroxine 125 mcg tablet, Take 1 tablet by mouth daily, Disp: , Rfl:     Magnesium 500 MG CAPS, Take by mouth 2 (two) times a day, Disp: , Rfl:     Omega-3 1000 MG CAPS, Take by mouth, Disp: , Rfl:     polyethylene glycol-propylene glycol (SYSTANE ULTRA) 0 4-0 3 %, Apply to eye, Disp: , Rfl:     No Known Allergies       Lab Review   Appointment on 03/14/2018   Component Date Value    TSH 3RD GENERATON 03/14/2018 1 727     Sodium 03/14/2018 140     Potassium 03/14/2018 4 7     Chloride 03/14/2018 102     CO2 03/14/2018 32     Anion Gap 03/14/2018 6     BUN 03/14/2018 12     Creatinine 03/14/2018 0 64     Glucose 03/14/2018 100     Calcium 03/14/2018 9 9     AST 03/14/2018 20     ALT 03/14/2018 24     Alkaline Phosphatase 03/14/2018 57     Total Protein 03/14/2018 7 8     Albumin 03/14/2018 3 4*    Total Bilirubin 03/14/2018 0 50     eGFR 03/14/2018 85     Color, UA 03/14/2018 Yellow     Clarity, UA 03/14/2018 Clear     Specific Gravity, UA 03/14/2018 1 010     pH, UA 03/14/2018 7 5     Leukocytes, UA 03/14/2018 Negative     Nitrite, UA 03/14/2018 Negative     Protein, UA 03/14/2018 Negative     Glucose, UA 03/14/2018 Negative     Ketones, UA 03/14/2018 Negative     Urobilinogen, UA 03/14/2018 0 2     Bilirubin, UA 03/14/2018 Negative     Blood, UA 03/14/2018 Negative    Admission on 11/24/2017, Discharged on 11/24/2017   Component Date Value    WBC 11/24/2017 9 30     RBC 11/24/2017 4 82     Hemoglobin 11/24/2017 15 4     Hematocrit 11/24/2017 45 3     MCV 11/24/2017 94     MCH 11/24/2017 32 0     MCHC 11/24/2017 34 0     RDW 11/24/2017 13 3     MPV 11/24/2017 10 1     Platelets 21/30/4391 355     nRBC 11/24/2017 0     Neutrophils Relative 11/24/2017 50     Lymphocytes Relative 11/24/2017 33     Monocytes Relative 11/24/2017 12     Eosinophils Relative 11/24/2017 4     Basophils Relative 11/24/2017 1     Neutrophils Absolute 11/24/2017 4 60     Lymphocytes Absolute 11/24/2017 3 09     Monocytes Absolute 11/24/2017 1 14     Eosinophils Absolute 11/24/2017 0 40     Basophils Absolute 11/24/2017 0 05     Sodium 11/24/2017 141     Potassium 11/24/2017 3 5     Chloride 11/24/2017 101     CO2 11/24/2017 33*    Anion Gap 11/24/2017 7     BUN 11/24/2017 18     Creatinine 11/24/2017 0 66     Glucose 11/24/2017 91     Calcium 11/24/2017 9 9     AST 11/24/2017 18     ALT 11/24/2017 22     Alkaline Phosphatase 11/24/2017 55     Total Protein 11/24/2017 7 6     Albumin 11/24/2017 3 5     Total Bilirubin 11/24/2017 0 40     eGFR 11/24/2017 84     Ventricular Rate 11/24/2017 47     Atrial Rate 11/24/2017 47     MI Interval 11/24/2017 170     QRSD Interval 11/24/2017 90     QT Interval 11/24/2017 470     QTC Interval 11/24/2017 415     P Axis 11/24/2017 62     QRS Axis 11/24/2017 2     T Wave [place] 11/24/2017 39    Appointment on 11/16/2017   Component Date Value     11/16/2017 7 1         Imaging: No results found      Objective:     Physical Exam   Constitutional: She is oriented to person, place, and time    HENT:   Head: Normocephalic  Right Ear: External ear normal    Left Ear: External ear normal    Eyes: EOM are normal  Pupils are equal, round, and reactive to light  Right eye exhibits no discharge  Neck: Neck supple  No tracheal deviation present  No thyromegaly present  Cardiovascular: Normal rate  Murmur (Grade 2-1/8 diastolic murmur ) heard  Pulmonary/Chest: No respiratory distress  She has no wheezes  She has no rales  Abdominal: Soft  There is no tenderness  Musculoskeletal: Normal range of motion  Lymphadenopathy:     She has no cervical adenopathy  Neurological: She is alert and oriented to person, place, and time  Skin: Skin is warm and dry  Psychiatric: She has a normal mood and affect  Patient Instructions     Continue the current med your blood pressure is perfect  About 2 months after you stop the magnesium go for the magnesium test and I will call with results  Keep your follow-up with Dr Michelle Washington and I will follow your mammograms  Will get echocardiogram for undiagnosed diastolic heart murmur  I will call with results        Follow up here in 4 months    [address] [place]

## 2018-03-16 NOTE — PATIENT INSTRUCTIONS
Continue the current med your blood pressure is perfect  About 2 months after you stop the magnesium go for the magnesium test and I will call with results  Keep your follow-up with Dr Elis Hooper and I will follow your mammograms  Will get echocardiogram for undiagnosed diastolic heart murmur  I will call with results

## 2018-04-04 ENCOUNTER — HOSPITAL ENCOUNTER (OUTPATIENT)
Dept: NON INVASIVE DIAGNOSTICS | Facility: CLINIC | Age: 83
Discharge: HOME | End: 2018-04-04
Payer: COMMERCIAL

## 2018-04-04 DIAGNOSIS — R01.1 HEART MURMUR: ICD-10-CM

## 2018-04-04 PROCEDURE — 93308 TTE F-UP OR LMTD: CPT | Performed by: INTERNAL MEDICINE

## 2018-04-04 PROCEDURE — 93306 TTE W/DOPPLER COMPLETE: CPT

## 2018-04-04 PROCEDURE — 93325 DOPPLER ECHO COLOR FLOW MAPG: CPT | Performed by: INTERNAL MEDICINE

## 2018-04-04 PROCEDURE — 93321 DOPPLER ECHO F-UP/LMTD STD: CPT | Performed by: INTERNAL MEDICINE

## 2018-04-06 ENCOUNTER — TELEPHONE (OUTPATIENT)
Dept: FAMILY MEDICINE CLINIC | Facility: CLINIC | Age: 83
End: 2018-04-06

## 2018-04-06 NOTE — TELEPHONE ENCOUNTER
Called both numbers and n/a lmov for call back on cell phone        ----- Message from Preethi Jane, [address] sent at 4/5/2018 12:22 PM EDT -----  Please let her know some leaky valves but not bad  Looks good

## 2018-04-12 ENCOUNTER — TELEPHONE (OUTPATIENT)
Dept: FAMILY MEDICINE CLINIC | Facility: CLINIC | Age: 83
End: 2018-04-12

## 2018-04-12 NOTE — TELEPHONE ENCOUNTER
Pt called in today stating that no one called her in regards to her echo, she would like to know the results

## 2018-04-12 NOTE — TELEPHONE ENCOUNTER
Echo looks ok  She does have trace to mild regurg of some valves, but nothing worrisome  Does she ever have any chest pain or sob? Would like to discuss further at f/u appt  This is not emergent, just want to talk more about it

## 2018-04-13 NOTE — TELEPHONE ENCOUNTER
Do you want to send a letter and let her know that we have tried to reach her and what the echo shows?

## 2018-04-13 NOTE — TELEPHONE ENCOUNTER
Patient was called twice on 4/6/2018 and left two messages, she never called back and tried to call her on both numbers again today

## 2018-05-09 ENCOUNTER — OFFICE VISIT (OUTPATIENT)
Dept: DERMATOLOGY | Facility: CLINIC | Age: 83
End: 2018-05-09
Payer: COMMERCIAL

## 2018-05-09 DIAGNOSIS — L72.0 MILIA: Primary | ICD-10-CM

## 2018-05-09 PROCEDURE — 99212 OFFICE O/P EST SF 10 MIN: CPT | Performed by: DERMATOLOGY

## 2018-05-09 NOTE — PROGRESS NOTES
[address] [facility] OF [address] [facility] DERMATOLOGY  [address] [place] [address]     MRN: [id number] : 1935  Encounter: [id number]  Patient Information: Meli Kurtzmaggi    Subjective:     28-year-old female was here in January presents for concerns regarding a spot that she noted near her right eye for about a month     Objective: There were no vitals taken for this visit  Physical Exam:    General Appearance:    Alert, cooperative, no distress   Skin:    2 mm white papule with some erythema around     Assessment:     1  Milia           Plan:    the milia no further treatment needed patient reassured these are benign lesions follow-up as previously scheduled      Prior to Admission medications    Medication Sig Start Date End Date Taking?  Authorizing Provider   ALREX 0 2 % SUSP INSTILL 1 DROP IN BOTH EYES EVERY OTHER DAY 12/15/17  Yes Historical Provider, MD   aspirin (ECOTRIN LOW STRENGTH) 81 mg EC tablet Take 81 mg by mouth daily   Yes Historical Provider, MD   bisoprolol-hydrochlorothiazide [facility]) 2 5-6 25 MG per tablet Take 1 tablet by mouth daily 17  Yes Historical Provider, MD   Calcium Carbonate-Vitamin D (CALCIUM 600+D) 600-400 MG-UNIT per tablet Take by mouth   Yes Historical Provider, MD   felodipine (PLENDIL) 10 MG 24 hr tablet Take 1 tablet by mouth daily   Yes Historical Provider, MD   fluticasone (FLONASE) 50 mcg/act nasal spray 1 spray into each nostril daily 18  Yes Yosvany Service, MD   hydrochlorothiazide (HYDRODIURIL) 12 5 mg tablet Take 1 tablet by mouth daily as needed   Yes Historical Provider, MD   irbesartan (AVAPRO) 300 mg tablet Take 1 tablet by mouth daily   Yes Historical Provider, MD   levothyroxine 125 mcg tablet Take 1 tablet by mouth daily   Yes Historical Provider, MD   [place]-3 1000 MG CAPS Take by mouth   Yes Historical Provider, MD   polyethylene glycol-propylene glycol (SYSTANE ULTRA) 0 4-0 3 % Apply to eye   Yes Historical Provider, MD   Magnesium 500 MG CAPS Take by mouth 2 (two) times a day 9/19/17   Historical Provider, MD     No Known Allergies    Alton Lisa MD  5/9/2018,12:59 PM    Portions of the record may have been created with voice recognition software   Occasional wrong word or "sound a like" substitutions may have occurred due to the inherent limitations of voice recognition software   Read the chart carefully and recognize, using context, where substitutions have occurred

## 2018-05-09 NOTE — PATIENT INSTRUCTIONS
the milia no further treatment needed patient reassured these are benign lesions follow-up as previously scheduled

## 2018-05-16 DIAGNOSIS — C56.9 MALIGNANT NEOPLASM OF OVARY (HCC): ICD-10-CM

## 2018-05-21 ENCOUNTER — TRANSCRIBE ORDERS (OUTPATIENT)
Dept: ADMINISTRATIVE | Facility: HOSPITAL | Age: 83
End: 2018-05-21

## 2018-05-21 ENCOUNTER — APPOINTMENT (OUTPATIENT)
Dept: LAB | Facility: HOSPITAL | Age: 83
End: 2018-05-21
Payer: COMMERCIAL

## 2018-05-21 DIAGNOSIS — C56.9 MALIGNANT NEOPLASM OF OVARY, UNSPECIFIED LATERALITY (HCC): ICD-10-CM

## 2018-05-21 DIAGNOSIS — C56.9 MALIGNANT NEOPLASM OF OVARY, UNSPECIFIED LATERALITY (HCC): Primary | ICD-10-CM

## 2018-05-21 LAB — CANCER AG125 SERPL-ACNC: 8.5 U/ML (ref 0–30)

## 2018-05-21 PROCEDURE — 86304 IMMUNOASSAY TUMOR CA 125: CPT

## 2018-05-21 PROCEDURE — 36415 COLL VENOUS BLD VENIPUNCTURE: CPT

## 2018-05-24 ENCOUNTER — OFFICE VISIT (OUTPATIENT)
Dept: GYNECOLOGIC ONCOLOGY | Facility: CLINIC | Age: 83
End: 2018-05-24
Payer: COMMERCIAL

## 2018-05-24 VITALS
HEART RATE: 56 BPM | HEIGHT: 61 IN | RESPIRATION RATE: 14 BRPM | SYSTOLIC BLOOD PRESSURE: 142 MMHG | WEIGHT: 163 LBS | BODY MASS INDEX: 30.78 KG/M2 | DIASTOLIC BLOOD PRESSURE: 84 MMHG | TEMPERATURE: 98.6 F

## 2018-05-24 DIAGNOSIS — C56.9 MALIGNANT NEOPLASM OF OVARY, UNSPECIFIED LATERALITY (HCC): Primary | ICD-10-CM

## 2018-05-24 DIAGNOSIS — Z85.3 HISTORY OF BREAST CANCER: ICD-10-CM

## 2018-05-24 PROCEDURE — 4040F PNEUMOC VAC/ADMIN/RCVD: CPT | Performed by: PHYSICIAN ASSISTANT

## 2018-05-24 PROCEDURE — 99214 OFFICE O/P EST MOD 30 MIN: CPT | Performed by: PHYSICIAN ASSISTANT

## 2018-05-24 NOTE — PROGRESS NOTES
Assessment/Plan:    Problem List Items Addressed This Visit        Genitourinary    Ovarian cancer ([facility] [address] ) - Primary     Clinically without evidence of disease recurrence   is normal  Return to the office in 6 months with a pre-visit   Relevant Orders          Other Visit Diagnoses     History of breast cancer        Relevant Orders    Ambulatory referral to Surgical Oncology            CHIEF COMPLAINT:   Ovarian cancer surveillance    Problem:  1  Recurrent ovarian cancer  2  History or breast cancer  3  BRCA wild-type (2010)      Previous therapy:     Ovarian cancer [facility])     Initial Diagnosis     Ovarian cancer ([facility] [address] )         11/2008 Surgery     Optimal cytoreductive surgery in November 2008          - 2009 Chemotherapy     Taxol and carboplatin x 6 cycles         4/9/2013 Surgery     Exploratory laparotomy, lymphadenectomy, splenectomy, omentectomy  A  Recurrently papillary serous carcinoma identified          Surgery     Placement of intraperitoneal ports          - 4/2015 Chemotherapy     Intravenous and intraperitoneal chemotherapy for 9 cycles followed by Hycamtin maintenance for 12 cycles completed in April 2015  Patient ID: Foy Klinefelter is a [de-identified] y o  female  who has no new complaints today  No vaginal bleeding, abdominal/pelvic pain  Normal bowel and bladder function  No interval change in medical history since last visit  Her  is normal  Quality of life is good  The following portions of the patient's history were reviewed and updated as appropriate: allergies, current medications, past medical history, past surgical history and problem list     Review of Systems   Constitutional: Negative  HENT: Negative  Eyes: Negative  Respiratory: Negative  Cardiovascular: Negative  Gastrointestinal: Negative  Genitourinary: Negative  Musculoskeletal: Negative  Skin: Negative  Neurological: Negative  Psychiatric/Behavioral: Negative  Current Outpatient Prescriptions   Medication Sig Dispense Refill    ALREX 0 2 % SUSP INSTILL 1 DROP IN BOTH EYES EVERY OTHER DAY  3    aspirin (ECOTRIN LOW STRENGTH) 81 mg EC tablet Take 81 mg by mouth daily      bisoprolol-hydrochlorothiazide (ZIAC) 2 5-6 25 MG per tablet Take 1 tablet by mouth daily      Calcium Carbonate-Vitamin D (CALCIUM 600+D) 600-400 MG-UNIT per tablet Take by mouth      felodipine (PLENDIL) 10 MG 24 hr tablet Take 1 tablet by mouth daily      fluticasone (FLONASE) 50 mcg/act nasal spray 1 spray into each nostril daily 16 g 0    hydrochlorothiazide (HYDRODIURIL) 12 5 mg tablet Take 1 tablet by mouth daily as needed      irbesartan (AVAPRO) 300 mg tablet Take 1 tablet by mouth daily      levothyroxine 125 mcg tablet Take 1 tablet by mouth daily      Magnesium 500 MG CAPS Take by mouth 2 (two) times a day      Omega-3 1000 MG CAPS Take by mouth      polyethylene glycol-propylene glycol (SYSTANE ULTRA) 0 4-0 3 % Apply to eye       No current facility-administered medications for this visit  Objective:    Blood pressure 142/84, pulse 56, temperature 98 6 °F (37 °C), temperature source Oral, resp  rate 14, height 5' 1" (1 549 m), weight 73 9 kg (163 lb)  Body mass index is 30 8 kg/m²  Body surface area is 1 73 meters squared  Physical Exam   Constitutional: She is oriented to person, place, and time  She appears well-developed and well-nourished  HENT:   Head: Normocephalic and atraumatic  Neck: Normal range of motion  Neck supple  No thyromegaly present  Pulmonary/Chest: Effort normal    Abdominal: Soft  She exhibits no distension and no mass  There is no rebound  Genitourinary:   Genitourinary Comments: The external female genitalia is normal  The bartholin's, uretheral and skenes glands are normal  The urethral meatus is normal  Speculum exam reveals a grossly normal vagina  No masses, lesions or bleeding   Manual exam notes a surgical absent cervix, uterus and adnexal structures  No masses or fullness  Musculoskeletal: Normal range of motion  She exhibits no edema  Lymphadenopathy:     She has no cervical adenopathy  Neurological: She is alert and oriented to person, place, and time  Skin: Skin is warm and dry  No rash noted  Psychiatric: She has a normal mood and affect  Her behavior is normal    Vitals reviewed        Lab Results   Component Value Date     8 5 05/21/2018

## 2018-05-24 NOTE — ASSESSMENT & PLAN NOTE
Clinically without evidence of disease recurrence   is normal  Return to the office in 6 months with a pre-visit

## 2018-07-09 DIAGNOSIS — I10 ESSENTIAL HYPERTENSION: Primary | ICD-10-CM

## 2018-07-09 RX ORDER — BISOPROLOL FUMARATE AND HYDROCHLOROTHIAZIDE 2.5; 6.25 MG/1; MG/1
TABLET ORAL
Qty: 90 TABLET | Refills: 1 | Status: SHIPPED | OUTPATIENT
Start: 2018-07-09

## 2018-07-09 RX ORDER — FELODIPINE 10 MG/1
TABLET, EXTENDED RELEASE ORAL
Qty: 90 TABLET | Refills: 1 | Status: SHIPPED | OUTPATIENT
Start: 2018-07-09

## 2018-07-13 ENCOUNTER — TELEPHONE (OUTPATIENT)
Dept: FAMILY MEDICINE CLINIC | Facility: CLINIC | Age: 83
End: 2018-07-13

## 2018-07-13 DIAGNOSIS — J31.0 RHINITIS, UNSPECIFIED TYPE: ICD-10-CM

## 2018-07-13 DIAGNOSIS — I10 ESSENTIAL HYPERTENSION: Primary | ICD-10-CM

## 2018-07-13 NOTE — TELEPHONE ENCOUNTER
Patient called and asked for med refills to mail in  She also informed me that she is moving back to [place] to be closer to family  She said as soon as they get settled and find a new doctor they will send for medical request  I told her we are going to miss her

## 2018-07-13 NOTE — TELEPHONE ENCOUNTER
Pt called looking for you     I informed her that you were not available and tired to help her but she said she needs [facility] nurse and that she will call back later on today

## 2018-07-14 RX ORDER — IRBESARTAN 300 MG/1
300 TABLET ORAL DAILY
Qty: 90 TABLET | Refills: 1 | Status: SHIPPED | OUTPATIENT
Start: 2018-07-14

## 2018-07-14 RX ORDER — FLUTICASONE PROPIONATE 50 MCG
1 SPRAY, SUSPENSION (ML) NASAL DAILY
Qty: 3 BOTTLE | Refills: 1 | Status: SHIPPED | OUTPATIENT
Start: 2018-07-14

## 2018-09-10 DIAGNOSIS — I10 ESSENTIAL HYPERTENSION: Primary | ICD-10-CM

## 2018-09-10 RX ORDER — HYDROCHLOROTHIAZIDE 12.5 MG/1
TABLET ORAL
Qty: 90 TABLET | Refills: 1 | Status: SHIPPED | OUTPATIENT
Start: 2018-09-10

## 2018-11-21 ENCOUNTER — TELEPHONE (OUTPATIENT)
Dept: GYNECOLOGIC ONCOLOGY | Facility: CLINIC | Age: 83
End: 2018-11-21

## 2018-11-21 NOTE — TELEPHONE ENCOUNTER
Patient called our [address] [place] [address] office today to cancel her new patient appointment with Dr Ann Cavazos and her follow up appointment with Alyse Ordonez which were both scheduled on 11/29  Patient had an unexpected moved to [place] and will seek care there  Patient will call back at later date to request records  Please be advised of patient's cancellations

## 2019-02-08 DIAGNOSIS — E03.9 HYPOTHYROIDISM, UNSPECIFIED TYPE: ICD-10-CM

## 2019-02-08 DIAGNOSIS — I10 ESSENTIAL HYPERTENSION: Primary | ICD-10-CM

## 2019-02-08 NOTE — PROGRESS NOTES
Called patient and n/a lmov for her to call back     Please schedule f/u appt for Kali  Have not seen her for almost a year  Also, her irbesartan has been recalled  Will change at f u  Labs first  Slip in emr

## 2019-02-08 NOTE — Clinical Note
Please schedule f/u appt for Cortez Parker  Have not seen her for almost a year  Also, her irbesartan has been recalled  Will change at f u  Labs first  Slip in emr